# Patient Record
Sex: FEMALE | Race: BLACK OR AFRICAN AMERICAN
[De-identification: names, ages, dates, MRNs, and addresses within clinical notes are randomized per-mention and may not be internally consistent; named-entity substitution may affect disease eponyms.]

---

## 2022-02-07 ENCOUNTER — HOSPITAL ENCOUNTER (OUTPATIENT)
Age: 77
End: 2022-02-07
Payer: MEDICARE

## 2022-02-07 DIAGNOSIS — Z01.812: Primary | ICD-10-CM

## 2022-02-07 DIAGNOSIS — Z11.52: ICD-10-CM

## 2022-02-07 PROCEDURE — U0003 INFECTIOUS AGENT DETECTION BY NUCLEIC ACID (DNA OR RNA); SEVERE ACUTE RESPIRATORY SYNDROME CORONAVIRUS 2 (SARS-COV-2) (CORONAVIRUS DISEASE [COVID-19]), AMPLIFIED PROBE TECHNIQUE, MAKING USE OF HIGH THROUGHPUT TECHNOLOGIES AS DESCRIBED BY CMS-2020-01-R: HCPCS

## 2022-02-07 PROCEDURE — C9803 HOPD COVID-19 SPEC COLLECT: HCPCS

## 2022-02-07 PROCEDURE — U0005 INFEC AGEN DETEC AMPLI PROBE: HCPCS

## 2022-02-08 ENCOUNTER — HOSPITAL ENCOUNTER (OUTPATIENT)
Dept: HOSPITAL 22 - OR | Age: 77
Discharge: HOME | End: 2022-02-08
Payer: MEDICARE

## 2022-02-08 VITALS
TEMPERATURE: 98.24 F | OXYGEN SATURATION: 100 % | HEART RATE: 86 BPM | RESPIRATION RATE: 16 BRPM | SYSTOLIC BLOOD PRESSURE: 135 MMHG | DIASTOLIC BLOOD PRESSURE: 79 MMHG

## 2022-02-08 VITALS
RESPIRATION RATE: 16 BRPM | OXYGEN SATURATION: 100 % | DIASTOLIC BLOOD PRESSURE: 71 MMHG | SYSTOLIC BLOOD PRESSURE: 111 MMHG | HEART RATE: 67 BPM

## 2022-02-08 VITALS
RESPIRATION RATE: 16 BRPM | OXYGEN SATURATION: 100 % | DIASTOLIC BLOOD PRESSURE: 67 MMHG | HEART RATE: 61 BPM | SYSTOLIC BLOOD PRESSURE: 105 MMHG

## 2022-02-08 VITALS
HEART RATE: 63 BPM | RESPIRATION RATE: 16 BRPM | SYSTOLIC BLOOD PRESSURE: 104 MMHG | DIASTOLIC BLOOD PRESSURE: 67 MMHG | OXYGEN SATURATION: 100 %

## 2022-02-08 VITALS
RESPIRATION RATE: 16 BRPM | HEART RATE: 65 BPM | OXYGEN SATURATION: 100 % | SYSTOLIC BLOOD PRESSURE: 100 MMHG | DIASTOLIC BLOOD PRESSURE: 62 MMHG

## 2022-02-08 VITALS
HEART RATE: 79 BPM | RESPIRATION RATE: 18 BRPM | DIASTOLIC BLOOD PRESSURE: 69 MMHG | TEMPERATURE: 97.16 F | SYSTOLIC BLOOD PRESSURE: 130 MMHG | OXYGEN SATURATION: 97 %

## 2022-02-08 VITALS
DIASTOLIC BLOOD PRESSURE: 70 MMHG | RESPIRATION RATE: 16 BRPM | HEART RATE: 67 BPM | OXYGEN SATURATION: 100 % | SYSTOLIC BLOOD PRESSURE: 111 MMHG

## 2022-02-08 VITALS
HEART RATE: 72 BPM | SYSTOLIC BLOOD PRESSURE: 130 MMHG | OXYGEN SATURATION: 98 % | RESPIRATION RATE: 16 BRPM | DIASTOLIC BLOOD PRESSURE: 65 MMHG

## 2022-02-08 VITALS
HEART RATE: 65 BPM | OXYGEN SATURATION: 100 % | RESPIRATION RATE: 16 BRPM | DIASTOLIC BLOOD PRESSURE: 63 MMHG | SYSTOLIC BLOOD PRESSURE: 109 MMHG

## 2022-02-08 VITALS
RESPIRATION RATE: 16 BRPM | OXYGEN SATURATION: 100 % | HEART RATE: 64 BPM | DIASTOLIC BLOOD PRESSURE: 65 MMHG | SYSTOLIC BLOOD PRESSURE: 115 MMHG

## 2022-02-08 VITALS — BODY MASS INDEX: 28.3 KG/M2

## 2022-02-08 DIAGNOSIS — E11.9: ICD-10-CM

## 2022-02-08 DIAGNOSIS — I10: ICD-10-CM

## 2022-02-08 DIAGNOSIS — E78.5: ICD-10-CM

## 2022-02-08 DIAGNOSIS — H25.813: Primary | ICD-10-CM

## 2022-02-08 DIAGNOSIS — Z79.84: ICD-10-CM

## 2022-02-08 DIAGNOSIS — Z80.9: ICD-10-CM

## 2022-02-08 DIAGNOSIS — H53.149: ICD-10-CM

## 2022-02-08 DIAGNOSIS — Z79.82: ICD-10-CM

## 2022-02-08 DIAGNOSIS — Z87.891: ICD-10-CM

## 2022-02-08 DIAGNOSIS — Z79.899: ICD-10-CM

## 2022-02-08 DIAGNOSIS — H59.211: ICD-10-CM

## 2022-02-08 PROCEDURE — 66984 XCAPSL CTRC RMVL W/O ECP: CPT

## 2022-02-08 PROCEDURE — 82962 GLUCOSE BLOOD TEST: CPT

## 2022-02-08 PROCEDURE — V2632 POST CHMBR INTRAOCULAR LENS: HCPCS

## 2022-02-19 ENCOUNTER — HOSPITAL ENCOUNTER (OUTPATIENT)
Age: 77
End: 2022-02-19
Payer: MEDICARE

## 2022-02-19 DIAGNOSIS — Z11.52: ICD-10-CM

## 2022-02-19 DIAGNOSIS — Z01.812: Primary | ICD-10-CM

## 2022-02-19 PROCEDURE — U0003 INFECTIOUS AGENT DETECTION BY NUCLEIC ACID (DNA OR RNA); SEVERE ACUTE RESPIRATORY SYNDROME CORONAVIRUS 2 (SARS-COV-2) (CORONAVIRUS DISEASE [COVID-19]), AMPLIFIED PROBE TECHNIQUE, MAKING USE OF HIGH THROUGHPUT TECHNOLOGIES AS DESCRIBED BY CMS-2020-01-R: HCPCS

## 2022-02-19 PROCEDURE — U0005 INFEC AGEN DETEC AMPLI PROBE: HCPCS

## 2022-02-19 PROCEDURE — C9803 HOPD COVID-19 SPEC COLLECT: HCPCS

## 2022-02-22 ENCOUNTER — HOSPITAL ENCOUNTER (OUTPATIENT)
Dept: HOSPITAL 22 - OR | Age: 77
Discharge: HOME | End: 2022-02-22
Payer: MEDICARE

## 2022-02-22 VITALS
OXYGEN SATURATION: 98 % | SYSTOLIC BLOOD PRESSURE: 117 MMHG | HEART RATE: 77 BPM | TEMPERATURE: 96.98 F | DIASTOLIC BLOOD PRESSURE: 71 MMHG | RESPIRATION RATE: 18 BRPM

## 2022-02-22 VITALS
OXYGEN SATURATION: 100 % | DIASTOLIC BLOOD PRESSURE: 70 MMHG | HEART RATE: 63 BPM | RESPIRATION RATE: 16 BRPM | SYSTOLIC BLOOD PRESSURE: 117 MMHG

## 2022-02-22 VITALS
HEART RATE: 98 BPM | OXYGEN SATURATION: 99 % | RESPIRATION RATE: 16 BRPM | SYSTOLIC BLOOD PRESSURE: 129 MMHG | TEMPERATURE: 97.9 F | DIASTOLIC BLOOD PRESSURE: 81 MMHG

## 2022-02-22 VITALS
OXYGEN SATURATION: 100 % | DIASTOLIC BLOOD PRESSURE: 75 MMHG | HEART RATE: 62 BPM | SYSTOLIC BLOOD PRESSURE: 122 MMHG | RESPIRATION RATE: 18 BRPM

## 2022-02-22 VITALS
SYSTOLIC BLOOD PRESSURE: 120 MMHG | OXYGEN SATURATION: 100 % | DIASTOLIC BLOOD PRESSURE: 71 MMHG | HEART RATE: 64 BPM | RESPIRATION RATE: 18 BRPM

## 2022-02-22 VITALS
OXYGEN SATURATION: 99 % | SYSTOLIC BLOOD PRESSURE: 134 MMHG | HEART RATE: 70 BPM | DIASTOLIC BLOOD PRESSURE: 76 MMHG | RESPIRATION RATE: 20 BRPM

## 2022-02-22 VITALS
OXYGEN SATURATION: 100 % | DIASTOLIC BLOOD PRESSURE: 69 MMHG | RESPIRATION RATE: 18 BRPM | HEART RATE: 72 BPM | SYSTOLIC BLOOD PRESSURE: 122 MMHG

## 2022-02-22 VITALS — BODY MASS INDEX: 28.3 KG/M2

## 2022-02-22 DIAGNOSIS — Z83.3: ICD-10-CM

## 2022-02-22 DIAGNOSIS — E11.9: ICD-10-CM

## 2022-02-22 DIAGNOSIS — Z79.899: ICD-10-CM

## 2022-02-22 DIAGNOSIS — H25.813: Primary | ICD-10-CM

## 2022-02-22 DIAGNOSIS — E78.5: ICD-10-CM

## 2022-02-22 DIAGNOSIS — H53.149: ICD-10-CM

## 2022-02-22 DIAGNOSIS — I10: ICD-10-CM

## 2022-02-22 DIAGNOSIS — Z82.49: ICD-10-CM

## 2022-02-22 DIAGNOSIS — Z79.82: ICD-10-CM

## 2022-02-22 PROCEDURE — V2632 POST CHMBR INTRAOCULAR LENS: HCPCS

## 2022-02-22 PROCEDURE — 82962 GLUCOSE BLOOD TEST: CPT

## 2022-02-22 PROCEDURE — 66984 XCAPSL CTRC RMVL W/O ECP: CPT

## 2022-05-21 ENCOUNTER — HOSPITAL ENCOUNTER (OUTPATIENT)
Age: 77
End: 2022-05-21
Payer: MEDICARE

## 2022-05-21 DIAGNOSIS — Z01.812: Primary | ICD-10-CM

## 2022-05-21 DIAGNOSIS — Z20.822: ICD-10-CM

## 2022-05-21 PROCEDURE — C9803 HOPD COVID-19 SPEC COLLECT: HCPCS

## 2022-05-21 PROCEDURE — U0005 INFEC AGEN DETEC AMPLI PROBE: HCPCS

## 2022-05-21 PROCEDURE — U0003 INFECTIOUS AGENT DETECTION BY NUCLEIC ACID (DNA OR RNA); SEVERE ACUTE RESPIRATORY SYNDROME CORONAVIRUS 2 (SARS-COV-2) (CORONAVIRUS DISEASE [COVID-19]), AMPLIFIED PROBE TECHNIQUE, MAKING USE OF HIGH THROUGHPUT TECHNOLOGIES AS DESCRIBED BY CMS-2020-01-R: HCPCS

## 2022-05-24 ENCOUNTER — HOSPITAL ENCOUNTER (OUTPATIENT)
Dept: HOSPITAL 22 - OUTP | Age: 77
Discharge: HOME | End: 2022-05-24
Payer: MEDICARE

## 2022-05-24 VITALS
DIASTOLIC BLOOD PRESSURE: 78 MMHG | SYSTOLIC BLOOD PRESSURE: 133 MMHG | HEART RATE: 63 BPM | TEMPERATURE: 98.42 F | OXYGEN SATURATION: 99 % | RESPIRATION RATE: 16 BRPM

## 2022-05-24 VITALS
SYSTOLIC BLOOD PRESSURE: 126 MMHG | RESPIRATION RATE: 16 BRPM | OXYGEN SATURATION: 97 % | TEMPERATURE: 97.8 F | HEART RATE: 66 BPM | DIASTOLIC BLOOD PRESSURE: 77 MMHG

## 2022-05-24 VITALS — BODY MASS INDEX: 27.1 KG/M2

## 2022-05-24 DIAGNOSIS — Z96.1: ICD-10-CM

## 2022-05-24 DIAGNOSIS — Z82.49: ICD-10-CM

## 2022-05-24 DIAGNOSIS — H26.491: Primary | ICD-10-CM

## 2022-05-24 DIAGNOSIS — H02.834: ICD-10-CM

## 2022-05-24 DIAGNOSIS — E11.9: ICD-10-CM

## 2022-05-24 DIAGNOSIS — I10: ICD-10-CM

## 2022-05-24 DIAGNOSIS — Z79.82: ICD-10-CM

## 2022-05-24 DIAGNOSIS — Z83.3: ICD-10-CM

## 2022-05-24 DIAGNOSIS — H02.831: ICD-10-CM

## 2022-05-24 DIAGNOSIS — Z80.9: ICD-10-CM

## 2022-05-24 DIAGNOSIS — Z79.84: ICD-10-CM

## 2022-05-24 DIAGNOSIS — Z79.899: ICD-10-CM

## 2022-05-24 LAB — GLUCOSE BLDC GLUCOMTR-MCNC: 127 MG/DL (ref 70–110)

## 2022-05-24 PROCEDURE — 66821 AFTER CATARACT LASER SURGERY: CPT

## 2022-05-24 PROCEDURE — 82962 GLUCOSE BLOOD TEST: CPT

## 2022-08-09 RX ORDER — TRIAMTERENE AND HYDROCHLOROTHIAZIDE 37.5; 25 MG/1; MG/1
1 CAPSULE ORAL EVERY MORNING
COMMUNITY
End: 2022-08-09 | Stop reason: SDUPTHER

## 2022-08-09 RX ORDER — TRIAMTERENE AND HYDROCHLOROTHIAZIDE 37.5; 25 MG/1; MG/1
1 CAPSULE ORAL EVERY MORNING
Qty: 30 CAPSULE | Refills: 2 | Status: SHIPPED | OUTPATIENT
Start: 2022-08-09 | End: 2022-11-07 | Stop reason: SDUPTHER

## 2022-09-29 ENCOUNTER — TELEPHONE (OUTPATIENT)
Dept: FAMILY MEDICINE CLINIC | Facility: CLINIC | Age: 77
End: 2022-09-29

## 2022-09-29 LAB
GLUCOSE BLDC GLUCOMTR-MCNC: 122 MG/DL (ref 70–110)
GLUCOSE BLDC GLUCOMTR-MCNC: 128 MG/DL (ref 70–110)

## 2022-09-29 NOTE — TELEPHONE ENCOUNTER
Caller: Norma Fletcher    Relationship: Self    Best call back number: [phone]    Requested Prescriptions:  METFORMIN 500 MG TABLETS 90 DAY SUPPLY     Pharmacy where request should be sent: [address] [phone] [facility] [phone]      Additional details provided by patient: WILL RUN OUT OVER THE WEEKEND    Does the patient have less than a 3 day supply:  [] Yes  [x] No    Aliza Palacios Rep   09/29/22 11:59 EDT

## 2022-10-10 ENCOUNTER — CLINICAL SUPPORT (OUTPATIENT)
Dept: FAMILY MEDICINE CLINIC | Facility: CLINIC | Age: 77
End: 2022-10-10

## 2022-10-10 DIAGNOSIS — Z23 FLU VACCINE NEED: Primary | ICD-10-CM

## 2022-10-10 PROCEDURE — 90662 IIV NO PRSV INCREASED AG IM: CPT | Performed by: INTERNAL MEDICINE

## 2022-10-10 PROCEDURE — G0008 ADMIN INFLUENZA VIRUS VAC: HCPCS | Performed by: INTERNAL MEDICINE

## 2022-11-07 RX ORDER — TRIAMTERENE AND HYDROCHLOROTHIAZIDE 37.5; 25 MG/1; MG/1
1 CAPSULE ORAL EVERY MORNING
Qty: 30 CAPSULE | Refills: 1 | Status: SHIPPED | OUTPATIENT
Start: 2022-11-07 | End: 2023-01-04 | Stop reason: SDUPTHER

## 2022-11-07 NOTE — TELEPHONE ENCOUNTER
Caller: Norma Fletcher    Relationship: Self    Best call back number: [phone]    Requested Prescriptions:   Requested Prescriptions     Pending Prescriptions Disp Refills   • triamterene-hydrochlorothiazide (DYAZIDE) 37.5-25 MG per capsule 30 capsule 2     Sig: Take 1 capsule by mouth Every Morning.        Pharmacy where request should be sent: [facility] [address] [place] [address] [phone] [facility] [phone]      Additional details provided by patient: PATIENT WOULD LIKE 90 DAY SUPPLY PLEASE.  WILL BE OUT OF PILLS TOMORROW.      Does the patient have less than a 3 day supply:  [x] Yes  [] No    Hue Tang   11/07/22 11:02 EST

## 2022-11-15 ENCOUNTER — TELEPHONE (OUTPATIENT)
Dept: FAMILY MEDICINE CLINIC | Facility: CLINIC | Age: 77
End: 2022-11-15

## 2022-11-15 NOTE — TELEPHONE ENCOUNTER
Caller: Norma Fletcher    Relationship: Self    Best call back number: [phone]    What is the best time to reach you: ANYTIME    Who are you requesting to speak with (clinical staff, provider,  specific staff member): CLINICAL STAFF    Do you know the name of the person who called: YAJAIRA    What was the call regarding: FOLLOW UP ABOUT METFORMIN DOSAGE. PATIENT STATES HER DOSAGE SHOULD BE 1.5 TABLETS 2 TIMES DAILY.    Do you require a callback: YES

## 2022-11-30 ENCOUNTER — OFFICE VISIT (OUTPATIENT)
Dept: FAMILY MEDICINE CLINIC | Facility: CLINIC | Age: 77
End: 2022-11-30

## 2022-11-30 ENCOUNTER — TELEPHONE (OUTPATIENT)
Dept: FAMILY MEDICINE CLINIC | Facility: CLINIC | Age: 77
End: 2022-11-30

## 2022-11-30 VITALS
RESPIRATION RATE: 16 BRPM | TEMPERATURE: 97.1 F | SYSTOLIC BLOOD PRESSURE: 151 MMHG | HEIGHT: 62 IN | BODY MASS INDEX: 28.39 KG/M2 | DIASTOLIC BLOOD PRESSURE: 80 MMHG | HEART RATE: 76 BPM | WEIGHT: 154.3 LBS

## 2022-11-30 DIAGNOSIS — B34.9 ACUTE VIRAL SYNDROME: Primary | ICD-10-CM

## 2022-11-30 PROCEDURE — 99213 OFFICE O/P EST LOW 20 MIN: CPT | Performed by: NURSE PRACTITIONER

## 2022-11-30 RX ORDER — ASPIRIN 81 MG/1
TABLET ORAL
COMMUNITY

## 2022-11-30 RX ORDER — FLUTICASONE PROPIONATE 50 MCG
2 SPRAY, SUSPENSION (ML) NASAL DAILY
Qty: 9.9 ML | Refills: 0 | Status: SHIPPED | OUTPATIENT
Start: 2022-11-30 | End: 2023-03-27

## 2022-11-30 RX ORDER — BROMPHENIRAMINE MALEATE, PSEUDOEPHEDRINE HYDROCHLORIDE, AND DEXTROMETHORPHAN HYDROBROMIDE 2; 30; 10 MG/5ML; MG/5ML; MG/5ML
SYRUP ORAL
Qty: 150 ML | Refills: 0 | Status: SHIPPED | OUTPATIENT
Start: 2022-11-30 | End: 2023-02-27

## 2022-11-30 NOTE — ASSESSMENT & PLAN NOTE
Complaints of cough and congestion x 3 days. No accompanying symptoms including sore throat, fever or ear pain. She reports she doesn't necessarily feel poorly but was being seen today as more of a precautionary measure as she would be exposing her  who has leukemia if she does have flu or COVID  Both flu and COVID negative in office today    -Symptom management  -Flonase nasal as written to assist with congestion  -Bromfed DM for cough  -Encouraged hydration and rest  -Advised wearing mask around her  and frequent handwashing until symptoms subside.

## 2022-11-30 NOTE — PROGRESS NOTES
"    Office Note     Name: Norma Fletcher    : 1945     MRN: [id number]     Chief Complaint  Cough (congestion)    Subjective     History of Present Illness:  Norma Fletcher is a 77 y.o. female who presents today for acute complaints of cough and congestion x three days. She reports on Monday she began to have a slight cough and some mild congestion which hasn't necessarily worsened and she \"doesn't feel that bad\". 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 She has not utilized OTC medications. She denies sore throat, fever, headache, body aches, nausea, vomiting or diarrhea. No known sick contacts.     Review of Systems   Constitutional: Negative for activity change, appetite change, fatigue and fever.   HENT: Positive for congestion and rhinorrhea. Negative for ear pain and sore throat.    Eyes: Negative for visual disturbance.   Respiratory: Positive for cough. Negative for chest tightness, shortness of breath and wheezing.    Cardiovascular: Negative for chest pain, palpitations and leg swelling.   Gastrointestinal: Negative for abdominal pain, constipation, diarrhea, nausea and vomiting.   Musculoskeletal: Negative for arthralgias and myalgias.   Skin: Negative for rash.   Neurological: Negative for dizziness, syncope, weakness, light-headedness, headache and confusion.       Objective     Past Medical History:   Diagnosis Date   • Dyslipidemia    • Dysphagia     Recurrent dysphagia secondary to Schatzki's ring; EGD 2014, 2015, Dr. Nicholson   • Essential (primary) hypertension    • Fibrocystic breast disease     status post stereotactic biopsy 9/15/09   • Hepatitis     A, B, and C, status post GI consult with no evidence of active disease   • Hypertension    • Iron deficiency    • Mixed hyperlipidemia    • Rash and other nonspecific skin eruption    • Synovial cyst " "of popliteal space (Baker), left knee    • Type 2 diabetes mellitus without complications (HCC)    • Unilateral primary osteoarthritis, left knee      Past Surgical History:   Procedure Laterality Date   • CATARACT EXTRACTION, BILATERAL  02/2022   • COLONOSCOPY      multiple all negative, most recently 7/15/2020 completely normal, Dr. Nicholson, recommendation for five-year followup in the year 2025     Family History   Problem Relation Age of Onset   • Breast cancer Other    • Colon cancer Other        Vital Signs  /80 (BP Location: Left arm, Patient Position: Sitting, Cuff Size: Adult)   Pulse 76   Temp 97.1 °F (36.2 °C) (Temporal)   Resp 16   Ht 157.5 cm (62\")   Wt 70 kg (154 lb 4.8 oz)   BMI 28.22 kg/m²   Estimated body mass index is 28.22 kg/m² as calculated from the following:    Height as of this encounter: 157.5 cm (62\").    Weight as of this encounter: 70 kg (154 lb 4.8 oz).    Physical Exam  Constitutional:       Appearance: Normal appearance.   HENT:      Head: Normocephalic and atraumatic.      Right Ear: Tympanic membrane, ear canal and external ear normal.      Left Ear: Tympanic membrane, ear canal and external ear normal.      Nose: Congestion and rhinorrhea present.      Mouth/Throat:      Mouth: Mucous membranes are moist.      Pharynx: Posterior oropharyngeal erythema present.   Eyes:      Conjunctiva/sclera: Conjunctivae normal.   Cardiovascular:      Rate and Rhythm: Normal rate and regular rhythm.      Heart sounds: Normal heart sounds.   Pulmonary:      Breath sounds: Normal breath sounds.   Abdominal:      General: Bowel sounds are normal.      Palpations: Abdomen is soft.   Musculoskeletal:         General: Normal range of motion.      Cervical back: Normal range of motion and neck supple.   Skin:     General: Skin is warm and dry.   Neurological:      Mental Status: She is alert and oriented to person, place, and time.   Psychiatric:         Mood and Affect: Mood normal.         " Behavior: Behavior normal.         Thought Content: Thought content normal.         Judgment: Judgment normal.            POCT Results (if applicable):  No results found for this or any previous visit.         Assessment and Plan     Diagnoses and all orders for this visit:    1. Acute viral syndrome (Primary)  Assessment & Plan:  Complaints of cough and congestion x 3 days. No accompanying symptoms including sore throat, fever or ear pain. She reports she doesn't necessarily feel poorly but was being seen today as more of a precautionary measure as she would be exposing her  who has leukemia if she does have flu or COVID  Both flu and COVID negative in office today    -Symptom management  -Flonase nasal as written to assist with congestion  -Bromfed DM for cough  -Encouraged hydration and rest  -Advised wearing mask around her  and frequent handwashing until symptoms subside.      Other orders  -     brompheniramine-pseudoephedrine-DM 30-2-10 MG/5ML syrup; Take 10mL by mouth up to four times daily as needed for cough and congestion  Dispense: 150 mL; Refill: 0  -     fluticasone (Flonase) 50 MCG/ACT nasal spray; 2 sprays into the nostril(s) as directed by provider Daily.  Dispense: 9.9 mL; Refill: 0      Follow Up  No follow-ups on file.    BRANDON Crabtree

## 2022-11-30 NOTE — TELEPHONE ENCOUNTER
Caller: [facility] [address] [place] [address] [facility] [phone] [facility] [phone]     Relationship: Pharmacy      What medications are you currently taking:   Current Outpatient Medications on File Prior to Visit   Medication Sig Dispense Refill   • aspirin 81 MG EC tablet Asprin Ec Low Dose 81 mg tablet,delayed release   Take 1 tablet every day by oral route.     • brompheniramine-pseudoephedrine-DM 30-2-10 MG/5ML syrup Take 10mL by mouth up to four times daily as needed for cough and congestion 150 mL 0   • fluticasone (Flonase) 50 MCG/ACT nasal spray 2 sprays into the nostril(s) as directed by provider Daily. 9.9 mL 0   • metFORMIN (Glucophage) 500 MG tablet Take 1-1/2 tablets twice daily 270 tablet 1   • triamterene-hydrochlorothiazide (DYAZIDE) 37.5-25 MG per capsule Take 1 capsule by mouth Every Morning. 30 capsule 1     No current facility-administered medications on file prior to visit.        What are your concerns:     STATED NOT ABLE TO GET THE 9.9ML FOR RX  fluticasone (Flonase) 50 MCG/ACT nasal spray      ONLY HAVE THE 16ML

## 2023-01-04 RX ORDER — TRIAMTERENE AND HYDROCHLOROTHIAZIDE 37.5; 25 MG/1; MG/1
1 CAPSULE ORAL EVERY MORNING
Qty: 30 CAPSULE | Refills: 1 | Status: SHIPPED | OUTPATIENT
Start: 2023-01-04 | End: 2023-03-06 | Stop reason: SDUPTHER

## 2023-01-04 NOTE — TELEPHONE ENCOUNTER
Caller: Norma Fletcher    Relationship: Self    Best call back number: [phone]     Requested Prescriptions:   Requested Prescriptions     Pending Prescriptions Disp Refills   • triamterene-hydrochlorothiazide (DYAZIDE) 37.5-25 MG per capsule 30 capsule 1     Sig: Take 1 capsule by mouth Every Morning.        Pharmacy where request should be sent: [facility] [address] [place] [address] [phone] [facility] [phone]      Additional details provided by patient: PATIENT HAS 4 DAYS LEFT.  PATIENT ASKED FOR A 90 DAY SUPPLY.    Does the patient have less than a 3 day supply:  [] Yes  [x] No    Would you like a call back once the refill request has been completed: [] Yes [x] No    If the office needs to give you a call back, can they leave a voicemail: [] Yes [x] No    Aliza Childress Rep   01/04/23 13:16 EST

## 2023-02-13 ENCOUNTER — TELEPHONE (OUTPATIENT)
Dept: FAMILY MEDICINE CLINIC | Facility: CLINIC | Age: 78
End: 2023-02-13

## 2023-02-13 DIAGNOSIS — I10 HYPERTENSION, UNSPECIFIED TYPE: Primary | ICD-10-CM

## 2023-02-13 DIAGNOSIS — E78.2 MODERATE MIXED HYPERLIPIDEMIA NOT REQUIRING STATIN THERAPY: ICD-10-CM

## 2023-02-13 RX ORDER — LISINOPRIL 20 MG/1
20 TABLET ORAL DAILY
Qty: 90 TABLET | Refills: 0 | Status: SHIPPED | OUTPATIENT
Start: 2023-02-13

## 2023-02-13 RX ORDER — LISINOPRIL 20 MG/1
1 TABLET ORAL DAILY
COMMUNITY
Start: 2022-11-12 | End: 2023-02-13 | Stop reason: SDUPTHER

## 2023-02-13 RX ORDER — PRAVASTATIN SODIUM 40 MG
TABLET ORAL
COMMUNITY
Start: 2022-11-12 | End: 2023-02-13 | Stop reason: SDUPTHER

## 2023-02-13 RX ORDER — PRAVASTATIN SODIUM 40 MG
40 TABLET ORAL DAILY
Qty: 90 TABLET | Refills: 0 | Status: SHIPPED | OUTPATIENT
Start: 2023-02-13

## 2023-02-13 NOTE — TELEPHONE ENCOUNTER
Caller: Norma Fletcher    Relationship: Self    Best call back number: [phone]    Requested Prescriptions:   Requested Prescriptions      No prescriptions requested or ordered in this encounter        Pharmacy where request should be sent:      Additional details provided by patient: PATIENT HAS SEVERAL MEDICATION PRESCRIBED BY OLD PCP, NEEDS REFILLS  Verapamil 120MG/QD  PRAVASTATIN 40MG/QD  LISINOPRIL 20MG/QD    PATIENT WOULD LIKE 90 DAY SUPPLY, PLEASE ADVISE IF A VISIT IS NEEDED, OR IF THEY CAN JUST BE FILLED AS 90 DAY      Does the patient have less than a 3 day supply:  [x] Yes  [] No    Would you like a call back once the refill request has been completed: [x] Yes [] No    If the office needs to give you a call back, can they leave a voicemail: [x] Yes [] No    Aliza Iqbal Rep   02/13/23 10:14 EST

## 2023-02-13 NOTE — TELEPHONE ENCOUNTER
Called and discussed with pt that she needs a physical in May will send in 90 days supply to get her to her appt that we scheduled today. IF SHE DOES NOT KEEP APPT she will NOT get anymore refills. She voiced understanding.

## 2023-02-27 ENCOUNTER — OFFICE VISIT (OUTPATIENT)
Dept: FAMILY MEDICINE CLINIC | Facility: CLINIC | Age: 78
End: 2023-02-27
Payer: MEDICARE

## 2023-02-27 VITALS
SYSTOLIC BLOOD PRESSURE: 128 MMHG | BODY MASS INDEX: 27.68 KG/M2 | RESPIRATION RATE: 16 BRPM | DIASTOLIC BLOOD PRESSURE: 74 MMHG | HEIGHT: 62 IN | TEMPERATURE: 97.3 F | HEART RATE: 78 BPM | OXYGEN SATURATION: 99 % | WEIGHT: 150.4 LBS

## 2023-02-27 DIAGNOSIS — R00.2 PALPITATIONS: Primary | ICD-10-CM

## 2023-02-27 DIAGNOSIS — E78.2 MIXED HYPERLIPIDEMIA: ICD-10-CM

## 2023-02-27 DIAGNOSIS — E11.9 CONTROLLED TYPE 2 DIABETES MELLITUS WITHOUT COMPLICATION, WITHOUT LONG-TERM CURRENT USE OF INSULIN: ICD-10-CM

## 2023-02-27 DIAGNOSIS — F41.8 SITUATIONAL ANXIETY: ICD-10-CM

## 2023-02-27 DIAGNOSIS — I10 PRIMARY HYPERTENSION: ICD-10-CM

## 2023-02-27 PROBLEM — G44.209 ACUTE NON INTRACTABLE TENSION-TYPE HEADACHE: Status: ACTIVE | Noted: 2023-02-27

## 2023-02-27 PROCEDURE — 99213 OFFICE O/P EST LOW 20 MIN: CPT | Performed by: NURSE PRACTITIONER

## 2023-02-27 PROCEDURE — 36415 COLL VENOUS BLD VENIPUNCTURE: CPT | Performed by: NURSE PRACTITIONER

## 2023-02-27 RX ORDER — FLUOXETINE HYDROCHLORIDE 20 MG/1
20 CAPSULE ORAL DAILY
Qty: 30 CAPSULE | Refills: 2 | Status: SHIPPED | OUTPATIENT
Start: 2023-02-27 | End: 2023-03-27

## 2023-02-27 NOTE — PROGRESS NOTES
Venipuncture Blood Specimen Collection  Venipuncture performed in left arm by Anne Hernandez MA with good hemostasis. Patient tolerated the procedure well without complications.   02/27/23   Anne Hernandez MA

## 2023-02-27 NOTE — ASSESSMENT & PLAN NOTE
A1c in office today at 6.7% which is below goal.  She checks her glucose at home once weekly, reporting readings are never over 150.  Currently utilizing metformin 500 mg once daily

## 2023-02-27 NOTE — ASSESSMENT & PLAN NOTE
"She reports feelings of \"flip-flop\" in her chest for the last couple of months.  She had the same issue many years ago at which time she underwent full cardiac evaluation without abnormal findings.  She cut back on caffeine and was put on Inderol with full resolution. Accompanied by headache and dizziness at times. BP well controlled.  She does have a significant amount of stress in her life currently with her  undergoing chemotherapy in [place] several days per week.    -EKG in office today at baseline, sinus rhythm with sinus arrhythmia.  -She denies any chest pain, shortness of breath or fluid retention  -Will stop caffeine consumption.  -We will check electrolytes and TSH today  -Discussed the fact that her symptoms could be related to anxiety which she endorses could be a direct possibility.  As a result we will initiate fluoxetine 20 mg once daily and monitor report for benefit in 3-weeks  -If palpitations continue will consider initiation of beta-blocker which has provided good benefit in the past  "

## 2023-02-27 NOTE — ASSESSMENT & PLAN NOTE
Well-controlled in office today.  She feels that her blood pressure at times runs high at home , but highest reported blood pressure 138/82.  Continue current verapamil 120 mg daily, lisinopril 20 mg once daily and triamterene-HCTZ 37.5-25 mg once daily dose

## 2023-02-28 PROBLEM — F41.8 SITUATIONAL ANXIETY: Status: ACTIVE | Noted: 2023-02-28

## 2023-02-28 LAB
BUN SERPL-MCNC: 10 MG/DL (ref 8–27)
BUN/CREAT SERPL: 16 (ref 12–28)
CALCIUM SERPL-MCNC: 8.9 MG/DL (ref 8.7–10.3)
CHLORIDE SERPL-SCNC: 103 MMOL/L (ref 96–106)
CO2 SERPL-SCNC: 21 MMOL/L (ref 20–29)
CREAT SERPL-MCNC: 0.61 MG/DL (ref 0.57–1)
EGFRCR SERPLBLD CKD-EPI 2021: 93 ML/MIN/1.73
GLUCOSE SERPL-MCNC: NORMAL MG/DL
HBA1C MFR BLD: 6.7 % (ref 4.8–5.6)
POTASSIUM SERPL-SCNC: NORMAL MMOL/L
SODIUM SERPL-SCNC: 138 MMOL/L (ref 134–144)
TSH SERPL DL<=0.005 MIU/L-ACNC: 1.64 UIU/ML (ref 0.45–4.5)

## 2023-02-28 PROCEDURE — 93000 ELECTROCARDIOGRAM COMPLETE: CPT | Performed by: NURSE PRACTITIONER

## 2023-02-28 NOTE — ASSESSMENT & PLAN NOTE
Her  is currently receiving cancer treatment in the form of chemotherapy.  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  She does note this can be overwhelming at times.  Denies sleep disturbance or depressive symptoms.    -Agreeable to initiation of fluoxetine 20 mg once

## 2023-02-28 NOTE — PROGRESS NOTES
"    Office Note     Name: Norma Fletcher    : 1945     MRN: [id number]     Chief Complaint  Palpitations    Subjective     History of Present Illness:  Norma Fletcher is a 77 y.o. female who presents today for acute complaint of headache and palpitations.  She reports feeling \"flip-flops\" in her chest intermittently for the last couple of months.  She also notes headaches.  She describes these headaches as across the back of her head as well as her forehead.  Though her blood pressure is well controlled in office today, 128/74, reports she has intermittently had elevated readings at home.  She continues to walk to get her mail daily.  She notes history of palpitations in the past with benign full cardiac work-up.  At that time she was placed on Inderal and stopped caffeine consumption with full resolution of palpitations.  She does make note of some increased stressors in her life recently including her  currently going through chemotherapy treatment for cancer.  She denies any feelings of depression or sleep disturbance.  No further complaints or concerns    Review of Systems   Constitutional: Negative for activity change, appetite change and fatigue.   Eyes: Negative for visual disturbance.   Respiratory: Negative for cough, chest tightness and shortness of breath.    Cardiovascular: Positive for palpitations. Negative for chest pain and leg swelling.   Gastrointestinal: Negative for abdominal pain, constipation, diarrhea, nausea, vomiting and indigestion.   Endocrine: Negative for cold intolerance, heat intolerance, polydipsia and polyuria.   Musculoskeletal: Negative for arthralgias and myalgias.   Skin: Negative for rash and bruise.   Neurological: Positive for headache. Negative for dizziness.       Objective     Past Medical History:   Diagnosis Date   • Dyslipidemia    • Dysphagia     Recurrent dysphagia secondary to Schatzki's ring; EGD 2014, 2015, Dr. Nicholson   • Essential (primary) " "hypertension    • Fibrocystic breast disease     status post stereotactic biopsy 9/15/09   • Hepatitis     A, B, and C, status post GI consult with no evidence of active disease   • Hypertension    • Iron deficiency    • Mixed hyperlipidemia    • Rash and other nonspecific skin eruption    • Synovial cyst of popliteal space (Baker), left knee    • Type 2 diabetes mellitus without complications (HCC)    • Unilateral primary osteoarthritis, left knee      Past Surgical History:   Procedure Laterality Date   • CATARACT EXTRACTION, BILATERAL  02/2022   • COLONOSCOPY      multiple all negative, most recently 7/15/2020 completely normal, Dr. Nicholson, recommendation for five-year followup in the year 2025     Family History   Problem Relation Age of Onset   • Breast cancer Other    • Colon cancer Other        Vital Signs  /74 (BP Location: Left arm, Patient Position: Sitting, Cuff Size: Adult)   Pulse 78   Temp 97.3 °F (36.3 °C) (Temporal)   Resp 16   Ht 157.5 cm (62\")   Wt 68.2 kg (150 lb 6.4 oz)   SpO2 99%   BMI 27.51 kg/m²   Estimated body mass index is 27.51 kg/m² as calculated from the following:    Height as of this encounter: 157.5 cm (62\").    Weight as of this encounter: 68.2 kg (150 lb 6.4 oz).    Physical Exam  Vitals reviewed.   Constitutional:       Appearance: Normal appearance.   HENT:      Head: Normocephalic and atraumatic.      Right Ear: Tympanic membrane, ear canal and external ear normal.      Left Ear: Tympanic membrane, ear canal and external ear normal.      Nose: Nose normal.      Mouth/Throat:      Mouth: Mucous membranes are moist.      Pharynx: Oropharynx is clear.   Eyes:      Conjunctiva/sclera: Conjunctivae normal.   Cardiovascular:      Rate and Rhythm: Normal rate and regular rhythm.      Pulses: Normal pulses.      Heart sounds: Normal heart sounds.   Pulmonary:      Effort: Pulmonary effort is normal.      Breath sounds: Normal breath sounds.   Abdominal:      General: Bowel " "sounds are normal.   Musculoskeletal:         General: Normal range of motion.      Cervical back: Normal range of motion and neck supple.   Skin:     General: Skin is warm and dry.   Neurological:      Mental Status: She is alert and oriented to person, place, and time.   Psychiatric:         Mood and Affect: Mood normal.         Behavior: Behavior normal.         Thought Content: Thought content normal.         Judgment: Judgment normal.        POCT Results (if applicable):  Results for orders placed or performed in visit on 02/27/23   Hemoglobin A1c    Specimen: Arm, Left; Blood    Blood  Release to forrest   Result Value Ref Range    Hemoglobin A1C 6.7 (H) 4.8 - 5.6 %   Basic Metabolic Panel    Specimen: Arm, Left; Blood    Blood  Release to forrest   Result Value Ref Range    Glucose CANCELED mg/dL    BUN 10 8 - 27 mg/dL    Creatinine 0.61 0.57 - 1.00 mg/dL    EGFR Result 93 >59 mL/min/1.73    BUN/Creatinine Ratio 16 12 - 28    Sodium 138 134 - 144 mmol/L    Potassium CANCELED mmol/L    Chloride 103 96 - 106 mmol/L    Total CO2 21 20 - 29 mmol/L    Calcium 8.9 8.7 - 10.3 mg/dL   TSH Rfx On Abnormal To Free T4    Specimen: Arm, Left; Blood    Blood  Release to forrest   Result Value Ref Range    TSH 1.640 0.450 - 4.500 uIU/mL         ECG 12 Lead    Date/Time: 2/28/2023 12:59 PM  Performed by: Concepción Shea APRN  Authorized by: Concepción Shea APRN   Comparison: compared with previous ECG from 5/16/2022  Similar to previous ECG  Rhythm: sinus rhythm and sinus arrhythmia  Rate: normal  Conduction: conduction normal  ST Segments: ST segments normal  T Waves: T waves normal  QRS axis: left    Clinical impression: abnormal EKG  Comments: Abnormal EKG but at baseline with comparison to previous ECG less than one year ago            Assessment and Plan     Diagnoses and all orders for this visit:    1. Palpitations (Primary)  Assessment & Plan:  She reports feelings of \"flip-flop\" in her chest for the last couple of " months.  She had the same issue many years ago at which time she underwent full cardiac evaluation without abnormal findings.  She cut back on caffeine and was put on Inderol with full resolution. Accompanied by headache and dizziness at times. BP well controlled.  She does have a significant amount of stress in her life currently with her  undergoing chemotherapy in [place] several days per week.    -EKG in office today at baseline, sinus rhythm with sinus arrhythmia.  -She denies any chest pain, shortness of breath or fluid retention  -Will stop caffeine consumption.  -We will check electrolytes and TSH today  -Discussed the fact that her symptoms could be related to anxiety which she endorses could be a direct possibility.  As a result we will initiate fluoxetine 20 mg once daily and monitor report for benefit in 3-weeks  -If palpitations continue will consider initiation of beta-blocker which has provided good benefit in the past    Orders:  -     TSH Rfx On Abnormal To Free T4; Future  -     Basic Metabolic Panel; Future  -     Basic Metabolic Panel  -     TSH Rfx On Abnormal To Free T4  -     ECG 12 Lead    2. Controlled type 2 diabetes mellitus without complication, without long-term current use of insulin ([facility])  Assessment & Plan:  A1c in office today at 6.7% which is below goal.  She checks her glucose at home once weekly, reporting readings are never over 150.  Currently utilizing metformin 500 mg once daily    Orders:  -     Hemoglobin A1c; Future  -     Hemoglobin A1c    3. Primary hypertension  Assessment & Plan:  Well-controlled in office today.  She feels that her blood pressure at times runs high at home , but highest reported blood pressure 138/82.  Continue current verapamil 120 mg daily, lisinopril 20 mg once daily and triamterene-HCTZ 37.5-25 mg once daily dose      4. Mixed hyperlipidemia  Assessment & Plan:  Historically well controlled on daily pravastatin 40 mg      5. Situational  anxiety  Assessment & Plan:  Her  is currently receiving cancer treatment in the form of chemotherapy.  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  She does note this can be overwhelming at times.  Denies sleep disturbance or depressive symptoms.    -Agreeable to initiation of fluoxetine 20 mg once      Other orders  -     FLUoxetine (PROzac) 20 MG capsule; Take 1 capsule by mouth Daily.  Dispense: 30 capsule; Refill: 2    BMI is >= 25 and <30. (Overweight) The following options were offered after discussion;: exercise counseling/recommendations and nutrition counseling/recommendations  Follow Up  Return in about 4 weeks (around 3/27/2023) for Next scheduled follow up.    Concepción Shea, APRN

## 2023-03-01 ENCOUNTER — TELEPHONE (OUTPATIENT)
Dept: FAMILY MEDICINE CLINIC | Facility: CLINIC | Age: 78
End: 2023-03-01
Payer: MEDICARE

## 2023-03-01 NOTE — TELEPHONE ENCOUNTER
----- Message from BRANDON Crabtree sent at 3/1/2023  8:51 AM EST -----  Please let Mrs. Fletcher know I have reviewed her labs and all are satisfactory. When I see her for follow-up in a couple of weeks we will see if cutting back on caffeine and staring the medication for anxiety has helped her symptoms, if not we will discuss starting medication for her feelings of palpitations

## 2023-03-06 ENCOUNTER — TELEPHONE (OUTPATIENT)
Dept: FAMILY MEDICINE CLINIC | Facility: CLINIC | Age: 78
End: 2023-03-06
Payer: MEDICARE

## 2023-03-06 RX ORDER — TRIAMTERENE AND HYDROCHLOROTHIAZIDE 37.5; 25 MG/1; MG/1
1 CAPSULE ORAL EVERY MORNING
Qty: 30 CAPSULE | Refills: 1 | Status: SHIPPED | OUTPATIENT
Start: 2023-03-06 | End: 2023-04-04

## 2023-03-06 NOTE — TELEPHONE ENCOUNTER
Caller: Norma Fletcher    Relationship: Self    Best call back number:  [phone]    Requested Prescriptions:   Requested Prescriptions      No prescriptions requested or ordered in this encounter      triamterene-hydrochlorothiazide (DYAZIDE) 37.5-25 MG per capsule    Pharmacy where request should be sent:      [facility] [address] [place] [address] [phone] [facility] [phone] FX     Does the patient have less than a 3 day supply:  [] Yes  [x] No    Would you like a call back once the refill request has been completed: [] Yes [x] No    If the office needs to give you a call back, can they leave a voicemail: [] Yes [x] No    Radha Hanson, PCT   03/06/23 09:50 EST

## 2023-03-27 ENCOUNTER — OFFICE VISIT (OUTPATIENT)
Dept: FAMILY MEDICINE CLINIC | Facility: CLINIC | Age: 78
End: 2023-03-27
Payer: MEDICARE

## 2023-03-27 VITALS
SYSTOLIC BLOOD PRESSURE: 138 MMHG | OXYGEN SATURATION: 97 % | HEIGHT: 62 IN | TEMPERATURE: 97.2 F | WEIGHT: 148 LBS | BODY MASS INDEX: 27.23 KG/M2 | RESPIRATION RATE: 18 BRPM | HEART RATE: 77 BPM | DIASTOLIC BLOOD PRESSURE: 82 MMHG

## 2023-03-27 DIAGNOSIS — S16.1XXA STRAIN OF NECK MUSCLE, INITIAL ENCOUNTER: ICD-10-CM

## 2023-03-27 DIAGNOSIS — R00.2 PALPITATIONS: Primary | ICD-10-CM

## 2023-03-27 DIAGNOSIS — I10 PRIMARY HYPERTENSION: ICD-10-CM

## 2023-03-27 DIAGNOSIS — F41.8 SITUATIONAL ANXIETY: ICD-10-CM

## 2023-03-27 RX ORDER — DORZOLAMIDE HYDROCHLORIDE AND TIMOLOL MALEATE 20; 5 MG/ML; MG/ML
SOLUTION/ DROPS OPHTHALMIC
COMMUNITY
Start: 2023-03-07

## 2023-03-27 RX ORDER — ATENOLOL 25 MG/1
TABLET ORAL
Qty: 30 TABLET | Refills: 1 | Status: SHIPPED | OUTPATIENT
Start: 2023-03-27

## 2023-03-27 RX ORDER — FLUOXETINE HYDROCHLORIDE 40 MG/1
40 CAPSULE ORAL DAILY
Qty: 30 CAPSULE | Refills: 1 | Status: SHIPPED | OUTPATIENT
Start: 2023-03-27

## 2023-03-27 RX ORDER — CYCLOBENZAPRINE HCL 5 MG
5 TABLET ORAL 3 TIMES DAILY PRN
Qty: 15 TABLET | Refills: 0 | Status: SHIPPED | OUTPATIENT
Start: 2023-03-27

## 2023-03-27 NOTE — PROGRESS NOTES
"    Office Note     Name: Norma Fletcher    : 1945     MRN: [id number]     Chief Complaint  pain in neck    Subjective     History of Present Illness:  Norma Fletcher is a 77 y.o. female who presents today for regarding palpitations, anxiety and hypertension.  She also has acute complaints of neck strain after falling asleep in all positions on her couch 3 days ago.  She was seen 4 weeks ago at which time she presented with complaints of headaches and feeling \"flip-flops\" in her chest intermittently for the last several months.  EKG performed in office that day was at baseline showing sinus rhythm with sinus arrhythmia.  She has documented history of palpitations with benign full cardiac work-up.  She is under a significant amount of stress currently, as her  is undergoing chemotherapy treatment for cancer, requiring frequent travel and long days in [place].  She notes in regards to her palpitations, in the past she was placed on a beta-blocker and decreased caffeine consumption with good benefit.  It was decided she would begin low-dose Prozac for situational anxiety, cut out caffeine and return today for further evaluation.  She notes she is having some benefit from current Prozac dosing in regards to feeling less overwhelmed with her  situation.  She does not note any improvement in feelings of palpitations.  Blood pressure is well controlled.  No further complaints or concerns today    Review of Systems   Constitutional: Positive for fatigue. Negative for activity change and appetite change.   Eyes: Negative for blurred vision and double vision.   Respiratory: Negative for cough, choking, chest tightness, shortness of breath and wheezing.    Cardiovascular: Positive for palpitations. Negative for chest pain and leg swelling.   Gastrointestinal: Negative for abdominal pain, constipation, diarrhea, nausea and vomiting.   Endocrine: Negative for polydipsia and polyuria.   Musculoskeletal: " "Positive for neck pain and neck stiffness.   Skin: Negative for rash and bruise.   Neurological: Positive for headache. Negative for dizziness, weakness and light-headedness.   Hematological: Does not bruise/bleed easily.   Psychiatric/Behavioral: Positive for stress. Negative for self-injury, suicidal ideas and depressed mood.       Objective     Past Medical History:   Diagnosis Date   • Dyslipidemia    • Dysphagia     Recurrent dysphagia secondary to Schatzki's ring; EGD 7/30/2014, 12/9/2015, Dr. Nicholson   • Essential (primary) hypertension    • Fibrocystic breast disease     status post stereotactic biopsy 9/15/09   • Hepatitis     A, B, and C, status post GI consult with no evidence of active disease   • Hypertension    • Iron deficiency    • Mixed hyperlipidemia    • Rash and other nonspecific skin eruption    • Synovial cyst of popliteal space (Baker), left knee    • Type 2 diabetes mellitus without complications (HCC)    • Unilateral primary osteoarthritis, left knee      Past Surgical History:   Procedure Laterality Date   • CATARACT EXTRACTION, BILATERAL  02/2022   • COLONOSCOPY      multiple all negative, most recently 7/15/2020 completely normal, Dr. Nicholson, recommendation for five-year followup in the year 2025     Family History   Problem Relation Age of Onset   • Breast cancer Other    • Colon cancer Other        Vital Signs  /82 (BP Location: Left arm, Patient Position: Sitting, Cuff Size: Adult)   Pulse 77   Temp 97.2 °F (36.2 °C) (Temporal)   Resp 18   Ht 157.5 cm (62\")   Wt 67.1 kg (148 lb)   SpO2 97%   BMI 27.07 kg/m²   Estimated body mass index is 27.07 kg/m² as calculated from the following:    Height as of this encounter: 157.5 cm (62\").    Weight as of this encounter: 67.1 kg (148 lb).    Physical Exam  Vitals reviewed.   Constitutional:       Appearance: Normal appearance.   HENT:      Head: Normocephalic and atraumatic.   Eyes:      Conjunctiva/sclera: Conjunctivae normal. "   Cardiovascular:      Rate and Rhythm: Normal rate and regular rhythm.      Pulses: Normal pulses.      Heart sounds: Normal heart sounds.   Pulmonary:      Effort: Pulmonary effort is normal.      Breath sounds: Normal breath sounds.   Musculoskeletal:      Cervical back: Neck supple. Tenderness present. Pain with movement and muscular tenderness present. Normal range of motion.   Skin:     General: Skin is warm and dry.   Neurological:      Mental Status: She is alert and oriented to person, place, and time.   Psychiatric:         Mood and Affect: Mood normal.         Behavior: Behavior normal.         Thought Content: Thought content normal.         Judgment: Judgment normal.            POCT Results (if applicable):  Results for orders placed or performed in visit on 02/27/23   Hemoglobin A1c    Specimen: Arm, Left; Blood    Blood  Release to forrest   Result Value Ref Range    Hemoglobin A1C 6.7 (H) 4.8 - 5.6 %   Basic Metabolic Panel    Specimen: Arm, Left; Blood    Blood  Release to forrest   Result Value Ref Range    Glucose CANCELED mg/dL    BUN 10 8 - 27 mg/dL    Creatinine 0.61 0.57 - 1.00 mg/dL    EGFR Result 93 >59 mL/min/1.73    BUN/Creatinine Ratio 16 12 - 28    Sodium 138 134 - 144 mmol/L    Potassium CANCELED mmol/L    Chloride 103 96 - 106 mmol/L    Total CO2 21 20 - 29 mmol/L    Calcium 8.9 8.7 - 10.3 mg/dL   TSH Rfx On Abnormal To Free T4    Specimen: Arm, Left; Blood    Blood  Release to forrest   Result Value Ref Range    TSH 1.640 0.450 - 4.500 uIU/mL            Assessment and Plan     Diagnoses and all orders for this visit:    1. Palpitations (Primary)  Assessment & Plan:  Presented 4 weeks ago with feelings of palpitations in her chest for the last couple of months.  She had the same issue many years ago at which time she underwent full cardiac evaluation with out abnormal findings.  She has eliminated caffeine which has provided little benefit.  In the past she has had good benefit with initiation  of beta-blocker as well as eliminating caffeine.  -We will initiate atenolol 25 mg once daily for palpitations.  She has been advised to check pulse before taking medication, if heart rate less than 60 hold atenolol dose.  -If significant side effects such as extreme fatigue or shortness of breath occur she is to stop medication      2. Primary hypertension  Assessment & Plan:  Well-controlled.  She also reports readings at goal below 140/80 at home.  Continue current verapamil 120 mg daily, lisinopril 20 mg once daily and triamterene-HCTZ once daily dosing      3. Situational anxiety  Assessment & Plan:  Situational anxiety that began when her  months diagnosed with cancer and is now undergoing chemotherapy.  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  She was started on Prozac 20 mg once daily during her last visit to help with her feelings of being overwhelmed.  Notes some improvement, but not complete resolution.  Feels she would benefit from increased Prozac to 40 mg once daily.  No SI/HI      4. Strain of neck muscle, initial encounter    Other orders  -     FLUoxetine (PROzac) 40 MG capsule; Take 1 capsule by mouth Daily.  Dispense: 30 capsule; Refill: 1  -     cyclobenzaprine (FLEXERIL) 5 MG tablet; Take 1 tablet by mouth 3 (Three) Times a Day As Needed for Muscle Spasms.  Dispense: 15 tablet; Refill: 0  -     atenolol (Tenormin) 25 MG tablet; Take one tablet by mouth daily. Check pulse before taking, if pulse less than 60 bpm do not take atenolol  Dispense: 30 tablet; Refill: 1    BMI is >= 25 and <30. (Overweight) The following options were offered after discussion;: exercise counseling/recommendations and nutrition counseling/recommendations    I spent 30 minutes caring for Norma on this date of service. This time includes time spent by me in the following activities:preparing for the visit, performing a medically appropriate examination and/or evaluation , counseling and educating the  patient/family/caregiver, ordering medications, tests, or procedures and documenting information in the medical record      Follow Up  Return for Next scheduled follow up.    Concepción Shea, APRN

## 2023-03-27 NOTE — ASSESSMENT & PLAN NOTE
Presented 4 weeks ago with feelings of palpitations in her chest for the last couple of months.  She had the same issue many years ago at which time she underwent full cardiac evaluation with out abnormal findings.  She has eliminated caffeine which has provided little benefit.  In the past she has had good benefit with initiation of beta-blocker as well as eliminating caffeine.  -We will initiate atenolol 25 mg once daily for palpitations.  She has been advised to check pulse before taking medication, if heart rate less than 60 hold atenolol dose.  -If significant side effects such as extreme fatigue or shortness of breath occur she is to stop medication

## 2023-03-27 NOTE — ASSESSMENT & PLAN NOTE
Situational anxiety that began when her  months diagnosed with cancer and is now undergoing chemotherapy.  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  She was started on Prozac 20 mg once daily during her last visit to help with her feelings of being overwhelmed.  Notes some improvement, but not complete resolution.  Feels she would benefit from increased Prozac to 40 mg once daily.  No SI/HI

## 2023-03-27 NOTE — ASSESSMENT & PLAN NOTE
Well-controlled.  She also reports readings at goal below 140/80 at home.  Continue current verapamil 120 mg daily, lisinopril 20 mg once daily and triamterene-HCTZ once daily dosing

## 2023-04-04 RX ORDER — TRIAMTERENE AND HYDROCHLOROTHIAZIDE 37.5; 25 MG/1; MG/1
CAPSULE ORAL
Qty: 30 CAPSULE | Refills: 0 | Status: SHIPPED | OUTPATIENT
Start: 2023-04-04

## 2023-04-24 ENCOUNTER — OFFICE VISIT (OUTPATIENT)
Dept: FAMILY MEDICINE CLINIC | Facility: CLINIC | Age: 78
End: 2023-04-24
Payer: MEDICARE

## 2023-04-24 VITALS
OXYGEN SATURATION: 97 % | WEIGHT: 147.8 LBS | SYSTOLIC BLOOD PRESSURE: 120 MMHG | TEMPERATURE: 97.2 F | RESPIRATION RATE: 16 BRPM | DIASTOLIC BLOOD PRESSURE: 72 MMHG | BODY MASS INDEX: 27.2 KG/M2 | HEART RATE: 81 BPM | HEIGHT: 62 IN

## 2023-04-24 DIAGNOSIS — R20.0 NUMBNESS OF RIGHT FOOT: ICD-10-CM

## 2023-04-24 DIAGNOSIS — E11.9 CONTROLLED TYPE 2 DIABETES MELLITUS WITHOUT COMPLICATION, WITHOUT LONG-TERM CURRENT USE OF INSULIN: Primary | ICD-10-CM

## 2023-04-24 DIAGNOSIS — I10 PRIMARY HYPERTENSION: ICD-10-CM

## 2023-04-24 DIAGNOSIS — E78.2 MIXED HYPERLIPIDEMIA: ICD-10-CM

## 2023-04-24 DIAGNOSIS — R00.2 PALPITATIONS: ICD-10-CM

## 2023-04-24 NOTE — ASSESSMENT & PLAN NOTE
Excellent control, /72 in office today.  Continue current medications including lisinopril 20 mg once daily and Dyazide 37.5-25 mg daily

## 2023-04-24 NOTE — ASSESSMENT & PLAN NOTE
A1c 6.7% 4 weeks ago.  Checks her glucose at home once weekly, reporting readings are never over 150.  Utilizing metformin 500 mg once daily with good benefit

## 2023-04-24 NOTE — ASSESSMENT & PLAN NOTE
She reports intermittent numbness and tingling of her right foot after changing the shoes that she exercises gym several days weekly.  On physical exam no decreased sensation or reflex.  Dorsal and pedal pulses strong and palpable.  Advised her to revert back to her old tennis shoes and utilize ibuprofen.  If no improvement return for further evaluation

## 2023-04-24 NOTE — PROGRESS NOTES
Office Note     Name: Norma Fletcher    : 1945     MRN: [id number]     Chief Complaint  Follow-up    Subjective     History of Present Illness:  Norma Fletcher is a 77 y.o. female who presents today for follow-up after initiation of atenolol for palpitations.  This has been a recurrent problem for her over the years.  EKG obtained at time of presentation was normal.  She was initially started on fluoxetine, thinking perhaps her feeling of palpitation was anxiety related, however her symptoms persisted.  Last visit atenolol 25 mg once daily was initiated as it had been in the past.  Today she reports she is only taking atenolol on days her heart rate is 80 or above which is approximately 3 to 4 days weekly.  She continues to take her fluoxetine 40 mg daily as well.  Pressure with excellent control, 120/72 in office today.  She continues to walk 3 to 4 days/week at the [facility].  Diabetes well controlled with last A1c 6.7%.   Review of Systems   Constitutional: Negative for activity change, appetite change and fatigue.   Respiratory: Positive for chest tightness. Negative for cough, shortness of breath and wheezing.    Cardiovascular: Negative for chest pain, palpitations and leg swelling.   Gastrointestinal: Negative for abdominal pain, constipation, diarrhea, nausea, vomiting and indigestion.   Endocrine: Negative for polydipsia and polyuria.   Musculoskeletal: Positive for arthralgias. Negative for myalgias.   Skin: Negative for rash and bruise.   Neurological: Positive for numbness. Negative for dizziness, weakness, light-headedness and headache.        Right foot numbness and tingling   Psychiatric/Behavioral: Negative for sleep disturbance and depressed mood. The patient is not nervous/anxious.        Objective     Past Medical History:   Diagnosis Date   • Dyslipidemia    • Dysphagia     Recurrent dysphagia secondary to Schatzki's ring; EGD 2014, 2015, Dr. Nicholson   • Essential (primary)  "hypertension    • Fibrocystic breast disease     status post stereotactic biopsy 9/15/09   • Hepatitis     A, B, and C, status post GI consult with no evidence of active disease   • Hypertension    • Iron deficiency    • Mixed hyperlipidemia    • Rash and other nonspecific skin eruption    • Synovial cyst of popliteal space (Baker), left knee    • Type 2 diabetes mellitus without complications    • Unilateral primary osteoarthritis, left knee      Past Surgical History:   Procedure Laterality Date   • CATARACT EXTRACTION, BILATERAL  02/2022   • COLONOSCOPY      multiple all negative, most recently 7/15/2020 completely normal, Dr. Nicholson, recommendation for five-year followup in the year 2025     Family History   Problem Relation Age of Onset   • Breast cancer Other    • Colon cancer Other        Vital Signs  /72 (BP Location: Right arm, Patient Position: Sitting, Cuff Size: Adult)   Pulse 81   Temp 97.2 °F (36.2 °C) (Temporal)   Resp 16   Ht 157.5 cm (62\")   Wt 67 kg (147 lb 12.8 oz)   SpO2 97%   BMI 27.03 kg/m²   Estimated body mass index is 27.03 kg/m² as calculated from the following:    Height as of this encounter: 157.5 cm (62\").    Weight as of this encounter: 67 kg (147 lb 12.8 oz).    Physical Exam  Vitals reviewed.   Constitutional:       Appearance: Normal appearance.   HENT:      Head: Normocephalic and atraumatic.      Right Ear: Tympanic membrane, ear canal and external ear normal.      Left Ear: Tympanic membrane, ear canal and external ear normal.      Mouth/Throat:      Mouth: Mucous membranes are moist.      Pharynx: Oropharynx is clear.   Eyes:      Conjunctiva/sclera: Conjunctivae normal.   Cardiovascular:      Rate and Rhythm: Normal rate and regular rhythm.      Pulses: Normal pulses.      Heart sounds: Normal heart sounds.   Pulmonary:      Effort: Pulmonary effort is normal.      Breath sounds: Normal breath sounds.   Musculoskeletal:         General: No tenderness.      " Cervical back: Normal range of motion and neck supple.      Right lower leg: No edema.      Left lower leg: No edema.   Skin:     General: Skin is warm and dry.   Neurological:      General: No focal deficit present.      Mental Status: She is alert and oriented to person, place, and time.   Psychiatric:         Mood and Affect: Mood normal.         Behavior: Behavior normal.         Thought Content: Thought content normal.         Judgment: Judgment normal.          POCT Results (if applicable):  Results for orders placed or performed in visit on 02/27/23   Hemoglobin A1c    Specimen: Arm, Left; Blood    Blood  Release to forrest   Result Value Ref Range    Hemoglobin A1C 6.7 (H) 4.8 - 5.6 %   Basic Metabolic Panel    Specimen: Arm, Left; Blood    Blood  Release to forrest   Result Value Ref Range    Glucose CANCELED mg/dL    BUN 10 8 - 27 mg/dL    Creatinine 0.61 0.57 - 1.00 mg/dL    EGFR Result 93 >59 mL/min/1.73    BUN/Creatinine Ratio 16 12 - 28    Sodium 138 134 - 144 mmol/L    Potassium CANCELED mmol/L    Chloride 103 96 - 106 mmol/L    Total CO2 21 20 - 29 mmol/L    Calcium 8.9 8.7 - 10.3 mg/dL   TSH Rfx On Abnormal To Free T4    Specimen: Arm, Left; Blood    Blood  Release to forrest   Result Value Ref Range    TSH 1.640 0.450 - 4.500 uIU/mL            Assessment and Plan     Diagnoses and all orders for this visit:    1. Controlled type 2 diabetes mellitus without complication, without long-term current use of insulin (Primary)  Assessment & Plan:  A1c 6.7% 4 weeks ago.  Checks her glucose at home once weekly, reporting readings are never over 150.  Utilizing metformin 500 mg once daily with good benefit      2. Primary hypertension  Assessment & Plan:  Excellent control, /72 in office today.  Continue current medications including lisinopril 20 mg once daily and Dyazide 37.5-25 mg daily      3. Palpitations  Assessment & Plan:  Presented 8 weeks ago with feelings of palpitations in her chest for the last  couple months.  He had the same issue many years ago at which time she underwent full cardiac evaluation without abnormal findings.  She initially eliminated caffeine which has provided little benefit.  We also treated her anxiety with fluoxetine 40 mg daily without improvement in palpitations.  In the past she had received good benefit with initiation of beta-blocker.  4 weeks ago atenolol was initiated.  Taking atenolol 2-3 times weekly with excellent benefit.  Reports if heart rate is less than 70 she holds dose for that day.      4. Mixed hyperlipidemia  Assessment & Plan:  Well-controlled on current pravastatin 40 mg daily dosing      5. Numbness of right foot  Assessment & Plan:  She reports intermittent numbness and tingling of her right foot after changing the shoes that she exercises gym several days weekly.  On physical exam no decreased sensation or reflex.  Dorsal and pedal pulses strong and palpable.  Advised her to revert back to her old tennis shoes and utilize ibuprofen.  If no improvement return for further evaluation      BMI is >= 25 and <30. (Overweight) The following options were offered after discussion;: exercise counseling/recommendations and nutrition counseling/recommendations    I spent 30 minutes caring for Norma on this date of service. This time includes time spent by me in the following activities:preparing for the visit, performing a medically appropriate examination and/or evaluation , counseling and educating the patient/family/caregiver and documenting information in the medical record    Follow Up  No follow-ups on file.    BRANDON Crabtree

## 2023-04-24 NOTE — ASSESSMENT & PLAN NOTE
Presented 8 weeks ago with feelings of palpitations in her chest for the last couple months.  He had the same issue many years ago at which time she underwent full cardiac evaluation without abnormal findings.  She initially eliminated caffeine which has provided little benefit.  We also treated her anxiety with fluoxetine 40 mg daily without improvement in palpitations.  In the past she had received good benefit with initiation of beta-blocker.  4 weeks ago atenolol was initiated.  Taking atenolol 2-3 times weekly with excellent benefit.  Reports if heart rate is less than 70 she holds dose for that day.

## 2023-05-11 DIAGNOSIS — I10 HYPERTENSION, UNSPECIFIED TYPE: ICD-10-CM

## 2023-05-13 DIAGNOSIS — E78.2 MODERATE MIXED HYPERLIPIDEMIA NOT REQUIRING STATIN THERAPY: ICD-10-CM

## 2023-05-15 RX ORDER — PRAVASTATIN SODIUM 40 MG
TABLET ORAL
Qty: 90 TABLET | Refills: 0 | Status: SHIPPED | OUTPATIENT
Start: 2023-05-15

## 2023-05-22 ENCOUNTER — OFFICE VISIT (OUTPATIENT)
Dept: FAMILY MEDICINE CLINIC | Facility: CLINIC | Age: 78
End: 2023-05-22
Payer: MEDICARE

## 2023-05-22 VITALS
DIASTOLIC BLOOD PRESSURE: 64 MMHG | OXYGEN SATURATION: 98 % | WEIGHT: 147.13 LBS | HEIGHT: 55 IN | BODY MASS INDEX: 34.05 KG/M2 | RESPIRATION RATE: 18 BRPM | HEART RATE: 53 BPM | SYSTOLIC BLOOD PRESSURE: 112 MMHG | TEMPERATURE: 97.4 F

## 2023-05-22 DIAGNOSIS — F41.8 SITUATIONAL ANXIETY: Primary | ICD-10-CM

## 2023-05-22 DIAGNOSIS — Z00.00 ENCOUNTER FOR SUBSEQUENT ANNUAL WELLNESS VISIT (AWV) IN MEDICARE PATIENT: ICD-10-CM

## 2023-05-22 DIAGNOSIS — E11.9 CONTROLLED TYPE 2 DIABETES MELLITUS WITHOUT COMPLICATION, WITHOUT LONG-TERM CURRENT USE OF INSULIN: ICD-10-CM

## 2023-05-22 DIAGNOSIS — E78.2 MIXED HYPERLIPIDEMIA: ICD-10-CM

## 2023-05-22 DIAGNOSIS — I10 PRIMARY HYPERTENSION: ICD-10-CM

## 2023-05-22 DIAGNOSIS — R00.2 PALPITATIONS: ICD-10-CM

## 2023-05-22 DIAGNOSIS — N81.10 VAGINAL PROLAPSE: ICD-10-CM

## 2023-05-22 PROBLEM — G44.209 ACUTE NON INTRACTABLE TENSION-TYPE HEADACHE: Status: RESOLVED | Noted: 2023-02-27 | Resolved: 2023-05-22

## 2023-05-22 NOTE — PROGRESS NOTES
Venipuncture Blood Specimen Collection  Venipuncture performed in left arm by Anne Hernandez MA with good hemostasis. Patient tolerated the procedure well without complications.   05/22/23   Anne Hernandez MA

## 2023-05-22 NOTE — PROGRESS NOTES
The ABCs of the Annual Wellness Visit  Subsequent Medicare Wellness Visit    Chief Complaint   Patient presents with   • Annual Exam      Subjective    History of Present Illness:  Norma Fletcher is a 77 y.o. female who presents for a Subsequent Medicare Wellness Visit.    The following portions of the patient's history were reviewed and   updated as appropriate: allergies, current medications, past family history, past medical history, past social history, past surgical history and problem list.    Compared to one year ago, the patient feels her physical   health is the same.    Compared to one year ago, the patient feels her mental   health is the same.    Recent Hospitalizations:  She was not admitted to the hospital during the last year.       Current Medical Providers:  Patient Care Team:  Concepción Shea APRN as PCP - General (Family Medicine)    Outpatient Medications Prior to Visit   Medication Sig Dispense Refill   • aspirin 81 MG EC tablet Asprin Ec Low Dose 81 mg tablet,delayed release   Take 1 tablet every day by oral route.     • atenolol (Tenormin) 25 MG tablet Take one tablet by mouth daily. Check pulse before taking, if pulse less than 60 bpm do not take atenolol 30 tablet 1   • dorzolamide-timolol (COSOPT) 22.3-6.8 MG/ML ophthalmic solution instill 1 drop into each eye twice daily     • esomeprazole (nexIUM) 20 MG capsule Take 1 capsule by mouth Every Morning Before Breakfast.     • FLUoxetine (PROzac) 40 MG capsule Take 1 capsule by mouth Daily. 30 capsule 1   • metFORMIN (Glucophage) 500 MG tablet Take 1-1/2 tablets twice daily 270 tablet 1   • Multiple Vitamins-Minerals (b complex-C-E-zinc) tablet Take 1 tablet by mouth Daily.     • pravastatin (PRAVACHOL) 40 MG tablet Take 1 tablet by mouth once daily 90 tablet 0   • triamterene-hydrochlorothiazide (DYAZIDE) 37.5-25 MG per capsule Take 1 capsule by mouth once daily in the morning 30 capsule 0   • verapamil SR (CALAN-SR) 120 MG CR tablet Take 1  "tablet by mouth once daily 90 tablet 0   • lisinopril (PRINIVIL,ZESTRIL) 20 MG tablet Take 1 tablet by mouth Daily. 90 tablet 0   • cyclobenzaprine (FLEXERIL) 5 MG tablet Take 1 tablet by mouth 3 (Three) Times a Day As Needed for Muscle Spasms. 15 tablet 0     No facility-administered medications prior to visit.       No opioid medication identified on active medication list. I have reviewed chart for other potential  high risk medication/s and harmful drug interactions in the elderly.          Aspirin is on active medication list. Aspirin use is indicated based on review of current medical condition/s. Pros and cons of this therapy have been discussed today. Benefits of this medication outweigh potential harm.  Patient has been encouraged to continue taking this medication.  .      Patient Active Problem List   Diagnosis   • Acute viral syndrome   • Palpitations   • Controlled type 2 diabetes mellitus without complication, without long-term current use of insulin   • Primary hypertension   • Mixed hyperlipidemia   • Situational anxiety   • Numbness of right foot   • Encounter for subsequent annual wellness visit (AWV) in Medicare patient   • Vaginal prolapse     Advance Care Planning  Advance Directive is not on file.  ACP discussion was held with the patient during this visit. Patient does not have an advance directive, information provided.          Objective    Vitals:    05/22/23 0941   BP: 112/64   BP Location: Right arm   Patient Position: Sitting   Cuff Size: Adult   Pulse: 53   Resp: 18   Temp: 97.4 °F (36.3 °C)   TempSrc: Temporal   SpO2: 98%   Weight: 66.7 kg (147 lb 2 oz)   Height: 62 cm (24.41\")     Estimated body mass index is 173.62 kg/m² as calculated from the following:    Height as of this encounter: 62 cm (24.41\").    Weight as of this encounter: 66.7 kg (147 lb 2 oz).    Class 3 Severe Obesity (BMI >=40). Obesity-related health conditions include the following: hypertension, diabetes mellitus and " dyslipidemias. Obesity is unchanged. BMI is is above average; BMI management plan is completed. We discussed portion control and increasing exercise.      Does the patient have evidence of cognitive impairment? No    Physical Exam  Lab Results   Component Value Date    CHLPL 154 2023    TRIG 101 2023    HDL 37 (L) 2023    LDL 98 2023    VLDL 19 2023    HGBA1C 6.5 (H) 2023            HEALTH RISK ASSESSMENT    Smoking Status:  Social History     Tobacco Use   Smoking Status Never   Smokeless Tobacco Never     Alcohol Consumption:  Social History     Substance and Sexual Activity   Alcohol Use Never     Fall Risk Screen:    STEADI Fall Risk Assessment was completed, and patient is at LOW risk for falls.Assessment completed on:2023    Depression Screenin/22/2023     9:40 AM   PHQ-2/PHQ-9 Depression Screening   Little Interest or Pleasure in Doing Things 0-->not at all   Feeling Down, Depressed or Hopeless 0-->not at all   PHQ-9: Brief Depression Severity Measure Score 0       Health Habits and Functional and Cognitive Screenin/22/2023     9:40 AM   Functional & Cognitive Status   Do you have difficulty preparing food and eating? No   Do you have difficulty bathing yourself, getting dressed or grooming yourself? No   Do you have difficulty using the toilet? No   Do you have difficulty moving around from place to place? No   Do you have trouble with steps or getting out of a bed or a chair? No   Current Diet Well Balanced Diet   Dental Exam Up to date   Eye Exam Up to date   Exercise (times per week) 3 times per week   Current Exercises Include Walking   Do you need help using the phone?  No   Are you deaf or do you have serious difficulty hearing?  No   Do you need help with transportation? No   Do you need help shopping? No   Do you need help preparing meals?  No   Do you need help with housework?  No   Do you need help with laundry? No   Do you need help taking  your medications? No   Do you need help managing money? No   Do you ever drive or ride in a car without wearing a seat belt? No       Age-appropriate Screening Schedule:  Refer to the list below for future screening recommendations based on patient's age, sex and/or medical conditions. Orders for these recommended tests are listed in the plan section. The patient has been provided with a written plan.    Health Maintenance   Topic Date Due   • ZOSTER VACCINE (3 of 3) 03/21/2023   • DXA SCAN  05/12/2023   • INFLUENZA VACCINE  08/01/2023   • HEMOGLOBIN A1C  11/22/2023   • DIABETIC EYE EXAM  02/16/2024   • ANNUAL WELLNESS VISIT  05/22/2024   • LIPID PANEL  05/22/2024   • URINE MICROALBUMIN  05/22/2024   • TDAP/TD VACCINES (2 - Td or Tdap) 10/30/2024   • HEPATITIS C SCREENING  Completed   • COVID-19 Vaccine  Completed   • Pneumococcal Vaccine 65+  Completed   • COLORECTAL CANCER SCREENING  Discontinued              Assessment & Plan   CMS Preventative Services Quick Reference  Risk Factors Identified During Encounter  None Identified  The above risks/problems have been discussed with the patient.  Follow up actions/plans if indicated are seen below in the Assessment/Plan Section.  Pertinent information has been shared with the patient in the After Visit Summary.    Diagnoses and all orders for this visit:    1. Situational anxiety (Primary)  Assessment & Plan:  Situational anxiety (her  was diagnosed with cancer and began chemotherapy.  He is a full-time caretaker with frequent trips to [place] for treatments.  Started on Prozac 40 mg once daily approximately 5 months ago with good benefit.  She tells me she will soon have assistance in caring for her  as they are moving to [place] to live with their son      2. Primary hypertension  Assessment & Plan:  Checks at home three times/week. Reports good control. Excellent BP in office today, 112/64      3. Palpitations  Assessment & Plan:  Patient notes  resolution of palpitations with initiation of atenolol.  Reports she is not taking the medication daily, she is checking her heart rate regularly and if it is less than 70 she holds the dose.      4. Mixed hyperlipidemia  Assessment & Plan:  Historically well controlled on daily pravastatin 40 mg, rechecking lipid panel today      5. Controlled type 2 diabetes mellitus without complication, without long-term current use of insulin  Assessment & Plan:  Glucose ranging from 111 to 139 at home. Checking AM fasting a few days/week. Had eye exam last month, getting every three months. No signs of retinopathy.  Up-to-date diabetic foot exam with abnormality    Orders:  -     Lipid Panel; Future  -     MicroAlbumin, Urine, Random - Urine, Clean Catch; Future  -     Hemoglobin A1c; Future  -     Hemoglobin A1c  -     MicroAlbumin, Urine, Random - Urine, Clean Catch  -     Lipid Panel    6. Encounter for subsequent annual wellness visit (AWV) in Medicare patient  Assessment & Plan:  Presents today for annual subsequent wellness visit.  She is currently up-to-date on colonoscopy and mammogram screening.  Discussed DEXA scan but she declines at this time.  Up-to-date on diabetic eye and foot exam without signs of retinopathy or neuropathy.  She has no complaints or concerns today    Orders:  -     Hepatitis C Antibody; Future  -     Hepatitis C Antibody    7. Vaginal prolapse  Assessment & Plan:  Continues to be followed by OB/GYN with pessary use        Follow Up:   Return in about 4 months (around 9/22/2023).     An After Visit Summary and PPPS were made available to the patient.

## 2023-05-22 NOTE — ASSESSMENT & PLAN NOTE
Glucose ranging from 111 to 139 at home. Checking AM fasting a few days/week. Had eye exam last month, getting every three months. No signs of retinopathy.  Up-to-date diabetic foot exam with abnormality

## 2023-05-23 LAB
CHOLEST SERPL-MCNC: 154 MG/DL (ref 100–199)
HBA1C MFR BLD: 6.5 % (ref 4.8–5.6)
HCV IGG SERPL QL IA: NON REACTIVE
HDLC SERPL-MCNC: 37 MG/DL
LDLC SERPL CALC-MCNC: 98 MG/DL (ref 0–99)
MICROALBUMIN UR-MCNC: 5.2 UG/ML
TRIGL SERPL-MCNC: 101 MG/DL (ref 0–149)
VLDLC SERPL CALC-MCNC: 19 MG/DL (ref 5–40)

## 2023-05-24 DIAGNOSIS — I10 HYPERTENSION, UNSPECIFIED TYPE: ICD-10-CM

## 2023-05-24 RX ORDER — LISINOPRIL 20 MG/1
TABLET ORAL
Qty: 90 TABLET | Refills: 1 | Status: SHIPPED | OUTPATIENT
Start: 2023-05-24

## 2023-05-24 NOTE — ASSESSMENT & PLAN NOTE
Presents today for annual subsequent wellness visit.  She is currently up-to-date on colonoscopy and mammogram screening.  Discussed DEXA scan but she declines at this time.  Up-to-date on diabetic eye and foot exam without signs of retinopathy or neuropathy.  She has no complaints or concerns today

## 2023-05-24 NOTE — TELEPHONE ENCOUNTER
Called and spoke with patient and advised her of lab results also if she has any questions or concerns to call the office and she verbalized understanding

## 2023-05-24 NOTE — ASSESSMENT & PLAN NOTE
Patient notes resolution of palpitations with initiation of atenolol.  Reports she is not taking the medication daily, she is checking her heart rate regularly and if it is less than 70 she holds the dose.

## 2023-05-24 NOTE — ASSESSMENT & PLAN NOTE
Situational anxiety (her  was diagnosed with cancer and began chemotherapy.  He is a full-time caretaker with frequent trips to [place] for treatments.  Started on Prozac 40 mg once daily approximately 5 months ago with good benefit.  She tells me she will soon have assistance in caring for her  as they are moving to [place] to live with their son

## 2023-05-24 NOTE — TELEPHONE ENCOUNTER
----- Message from BRANDON Crabtree sent at 5/24/2023  4:15 PM EDT -----  Please let ms. Fletcher know her labs are satisfactory. Her A1C is 6.5% which is even better than it was two months ago.

## 2023-06-01 RX ORDER — FLUOXETINE HYDROCHLORIDE 40 MG/1
40 CAPSULE ORAL DAILY
Qty: 30 CAPSULE | Refills: 0 | Status: SHIPPED | OUTPATIENT
Start: 2023-06-01

## 2023-06-05 RX ORDER — TRIAMTERENE AND HYDROCHLOROTHIAZIDE 37.5; 25 MG/1; MG/1
CAPSULE ORAL
Qty: 30 CAPSULE | Refills: 0 | Status: SHIPPED | OUTPATIENT
Start: 2023-06-05

## 2023-08-07 ENCOUNTER — OFFICE VISIT (OUTPATIENT)
Dept: FAMILY MEDICINE CLINIC | Facility: CLINIC | Age: 78
End: 2023-08-07
Payer: MEDICARE

## 2023-08-07 VITALS
DIASTOLIC BLOOD PRESSURE: 70 MMHG | TEMPERATURE: 97.8 F | WEIGHT: 150 LBS | SYSTOLIC BLOOD PRESSURE: 122 MMHG | BODY MASS INDEX: 26.58 KG/M2 | HEIGHT: 63 IN

## 2023-08-07 DIAGNOSIS — E11.9 CONTROLLED TYPE 2 DIABETES MELLITUS WITHOUT COMPLICATION, WITHOUT LONG-TERM CURRENT USE OF INSULIN: ICD-10-CM

## 2023-08-07 DIAGNOSIS — I10 HYPERTENSION, UNSPECIFIED TYPE: ICD-10-CM

## 2023-08-07 DIAGNOSIS — I10 PRIMARY HYPERTENSION: Primary | ICD-10-CM

## 2023-08-07 DIAGNOSIS — F41.8 SITUATIONAL ANXIETY: ICD-10-CM

## 2023-08-07 DIAGNOSIS — E78.2 MIXED HYPERLIPIDEMIA: ICD-10-CM
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
## 2023-08-07 PROCEDURE — 3074F SYST BP LT 130 MM HG: CPT | Performed by: FAMILY MEDICINE

## 2023-08-07 PROCEDURE — 3008F BODY MASS INDEX DOCD: CPT | Performed by: FAMILY MEDICINE

## 2023-08-07 PROCEDURE — 3078F DIAST BP <80 MM HG: CPT | Performed by: FAMILY MEDICINE

## 2023-08-07 PROCEDURE — 1159F MED LIST DOCD IN RCRD: CPT | Performed by: FAMILY MEDICINE

## 2023-08-07 PROCEDURE — 1160F RVW MEDS BY RX/DR IN RCRD: CPT | Performed by: FAMILY MEDICINE

## 2023-08-07 PROCEDURE — 99214 OFFICE O/P EST MOD 30 MIN: CPT | Performed by: FAMILY MEDICINE

## 2023-08-07 RX ORDER — TRIAMTERENE AND HYDROCHLOROTHIAZIDE 37.5; 25 MG/1; MG/1
CAPSULE ORAL
Qty: 30 CAPSULE | Refills: 0 | Status: SHIPPED | OUTPATIENT
Start: 2023-08-07 | End: 2023-08-07 | Stop reason: SDUPTHER

## 2023-08-07 RX ORDER — LANOLIN ALCOHOL/MO/W.PET/CERES
400 CREAM (GRAM) TOPICAL DAILY
COMMUNITY

## 2023-08-07 RX ORDER — PRAVASTATIN SODIUM 40 MG
40 TABLET ORAL DAILY
Qty: 90 TABLET | Refills: 1 | Status: SHIPPED | OUTPATIENT
Start: 2023-08-07

## 2023-08-07 RX ORDER — TRIAMTERENE AND HYDROCHLOROTHIAZIDE 37.5; 25 MG/1; MG/1
1 CAPSULE ORAL EVERY MORNING
Qty: 90 CAPSULE | Refills: 1 | Status: SHIPPED | OUTPATIENT
Start: 2023-08-07

## 2023-08-07 RX ORDER — ATENOLOL 25 MG/1
TABLET ORAL
Qty: 30 TABLET | Refills: 1 | Status: SHIPPED | OUTPATIENT
Start: 2023-08-07

## 2023-08-07 RX ORDER — OXYQUINOLINE SULFATE AND SODIUM LAURYL SULFATE .25; .1 MG/G; MG/G
JELLY VAGINAL
COMMUNITY
Start: 2023-07-27

## 2023-08-07 RX ORDER — FERROUS SULFATE TAB EC 324 MG (65 MG FE EQUIVALENT) 324 (65 FE) MG
324 TABLET DELAYED RESPONSE ORAL
COMMUNITY

## 2023-08-07 RX ORDER — FLUOXETINE HYDROCHLORIDE 40 MG/1
40 CAPSULE ORAL DAILY
Qty: 90 CAPSULE | Refills: 1 | Status: SHIPPED | OUTPATIENT
Start: 2023-08-07

## 2023-08-07 RX ORDER — PHENOL 1.4 %
600 AEROSOL, SPRAY (ML) MUCOUS MEMBRANE DAILY
COMMUNITY

## 2023-08-07 RX ORDER — MELOXICAM 15 MG/1
15 TABLET ORAL DAILY
Qty: 30 TABLET | Refills: 0 | Status: SHIPPED | OUTPATIENT
Start: 2023-08-07

## 2023-08-07 RX ORDER — LISINOPRIL 20 MG/1
20 TABLET ORAL DAILY
Qty: 90 TABLET | Refills: 1 | Status: SHIPPED | OUTPATIENT
Start: 2023-08-07

## 2023-08-07 NOTE — ASSESSMENT & PLAN NOTE
Lipid abnormalities are unchanged.  Nutritional counseling was provided. and Pharmacotherapy as ordered.  Lipids will be reassessed in 3 months.  FLP will be ordered at follow up

## 2023-08-07 NOTE — ASSESSMENT & PLAN NOTE
Diabetes is improving with lifestyle modifications.   Continue current treatment regimen.  Reminded to bring in blood sugar diary at next visit.  Diabetes will be reassessed in 3 months.  Up to date on health maintenance, continue Metformin only

## 2023-08-07 NOTE — PROGRESS NOTES
Subjective     Chief Complaint  No chief complaint on file.    Subjective          Norma Fletcher is a 77 y.o. female who presents today to [facility] for initial evaluation .    HPI:   History of Present Illness      Ms. Fletcher is a very pleasant 77-year-old female who presents today to establish care with a primary care physician.    She has a past medical history of hypertension, hyperlipidemia, type 2 diabetes, situational anxiety.      For her hypertension she is prescribed atenolol 25 mg daily, lisinopril 20 mg daily, triamterene/hydrochlorothiazide 37.5-25 mg daily and verapamil 120 mg daily.  Her blood pressure is very well controlled in office today at 122/70    For her diabetes her current prescription is metformin 500 mg daily, her hemoglobin A1c was 6.5% in May    About 4 weeks ago,. She had a fall on the stairs where she was walking in flip-flops and turned to go back down the stairs and lost her footing falling hard on her arm.  She was seen in urgent care and x-rays were negative for fractures.  She was diagnosed with a hematoma of the arm and had some swelling in the thumb.    The following portions of the patient's history were reviewed and updated as appropriate: allergies, current medications, past family history, past medical history, past social history, past surgical history and problem list.    Objective     Objective     Allergy:   No Known Allergies     Current Medications:   Current Outpatient Medications   Medication Sig Dispense Refill    aspirin 81 MG EC tablet Asprin Ec Low Dose 81 mg tablet,delayed release   Take 1 tablet every day by oral route.      atenolol (Tenormin) 25 MG tablet Take one tablet by mouth daily. Check pulse before taking, if pulse less than 60 bpm do not take atenolol 30 tablet 1    calcium carbonate (OS-KEYANNA) 600 MG tablet Take 1 tablet by mouth Daily.      dorzolamide-timolol (COSOPT) 22.3-6.8 MG/ML ophthalmic solution instill  1 drop into each eye twice daily      esomeprazole (nexIUM) 20 MG capsule Take 1 capsule by mouth Every Morning Before Breakfast.      ferrous sulfate 324 (65 Fe) MG tablet delayed-release EC tablet Take 1 tablet by mouth Daily With Breakfast.      FLUoxetine (PROzac) 40 MG capsule Take 1 capsule by mouth Daily. 90 capsule 1    lisinopril (PRINIVIL,ZESTRIL) 20 MG tablet Take 1 tablet by mouth Daily. 90 tablet 1    Magnesium Oxide -Mg Supplement 400 (240 Mg) MG tablet Take 1 tablet by mouth Daily.      metFORMIN (GLUCOPHAGE) 500 MG tablet TAKE 1 & 1/2 (ONE & ONE-HALF) TABLETS BY MOUTH TWICE DAILY 270 tablet 1    Multiple Vitamins-Minerals (b complex-C-E-zinc) tablet Take 1 tablet by mouth Daily.      pravastatin (PRAVACHOL) 40 MG tablet Take 1 tablet by mouth Daily. 90 tablet 1    triamterene-hydrochlorothiazide (DYAZIDE) 37.5-25 MG per capsule Take 1 capsule by mouth Every Morning. 90 capsule 1    Trimo-Hanley 0.025-0.01 % gel INSERT 1 GRAM APPLICATOR IN VAGINA TWICE A WEEK AS NEEDED.      verapamil SR (CALAN-SR) 120 MG CR tablet Take 1 tablet by mouth Daily. 90 tablet 1    vitamin D3 125 MCG (5000 UT) capsule capsule Take 1 capsule by mouth Daily.      meloxicam (Mobic) 15 MG tablet Take 1 tablet by mouth Daily. 30 tablet 0     No current facility-administered medications for this visit.       Past Medical History:  Past Medical History:   Diagnosis Date    Dyslipidemia     Dysphagia     Recurrent dysphagia secondary to Schatzki's ring; EGD 7/30/2014, 12/9/2015, Dr. Nicholson    Essential (primary) hypertension     Fibrocystic breast disease     status post stereotactic biopsy 9/15/09    Hepatitis     A, B, and C, status post GI consult with no evidence of active disease    Hypertension     Iron deficiency     Mixed hyperlipidemia     Rash and other nonspecific skin eruption     Synovial cyst of popliteal space ([place]), left knee     Type 2 diabetes mellitus without complications     Unilateral primary osteoarthritis,  "left knee        Past Surgical History:  Past Surgical History:   Procedure Laterality Date    CATARACT EXTRACTION, BILATERAL  02/2022    COLONOSCOPY      multiple all negative, most recently 7/15/2020 completely normal, Dr. Nicholson, recommendation for five-year followup in the year 2025       Social History:  Social History     Socioeconomic History    Marital status:    Tobacco Use    Smoking status: Never    Smokeless tobacco: Never   Vaping Use    Vaping Use: Never used   Substance and Sexual Activity    Alcohol use: Never    Drug use: Never       Family History:  Family History   Problem Relation Age of Onset    Breast cancer Other     Colon cancer Other          Vital Signs:   /70   Temp 97.8 øF (36.6 øC) (Infrared)   Ht 160 cm (63\")   Wt 68 kg (150 lb)   BMI 26.57 kg/mý      Physical Exam:  Physical Exam  Constitutional:       Appearance: She is normal weight.   HENT:      Right Ear: Tympanic membrane normal.   Cardiovascular:      Rate and Rhythm: Normal rate and regular rhythm.      Pulses: Normal pulses.      Heart sounds: No murmur heard.  Pulmonary:      Effort: Pulmonary effort is normal. No respiratory distress.      Breath sounds: No wheezing.   Abdominal:      General: Abdomen is flat. Bowel sounds are normal.   Musculoskeletal:         General: No swelling or tenderness.      Cervical back: Normal range of motion.   Neurological:      General: No focal deficit present.      Mental Status: She is alert. Mental status is at baseline.   Psychiatric:         Mood and Affect: Mood normal.         Behavior: Behavior normal.         Thought Content: Thought content normal.         Judgment: Judgment normal.             PHQ-9 Score  PHQ-9 Total Score:       Lab Review  Office Visit on 05/22/2023   Component Date Value Ref Range Status    Hemoglobin A1C 05/22/2023 6.5 (H)  4.8 - 5.6 % Final    Comment:          Prediabetes: 5.7 - 6.4           Diabetes: >6.4           Glycemic control for " adults with diabetes: <7.0      Microalbumin, Urine 05/22/2023 5.2  Not Estab. ug/mL Final    Hep C Virus Ab 05/22/2023 Non Reactive  Non Reactive Final    Comment: HCV antibody alone does not differentiate between previously  resolved infection and active infection. Equivocal and Reactive  HCV antibody results should be followed up with an HCV RNA test  to support the diagnosis of active HCV infection.      Total Cholesterol 05/22/2023 154  100 - 199 mg/dL Final    Triglycerides 05/22/2023 101  0 - 149 mg/dL Final    HDL Cholesterol 05/22/2023 37 (L)  >39 mg/dL Final    VLDL Cholesterol [place] 05/22/2023 19  5 - 40 mg/dL Final    LDL Chol Calc (NIH) 05/22/2023 98  0 - 99 mg/dL Final        Radiology Results        Assessment / Plan         Assessment and Plan   Diagnoses and all orders for this visit:    1. Primary hypertension (Primary)  Assessment & Plan:  Hypertension is improving with treatment.  Continue current treatment regimen.  Dietary sodium restriction.  Regular aerobic exercise.  Ambulatory blood pressure monitoring.  Blood pressure will be reassessed at the next regular appointment.    Orders:  -     atenolol (Tenormin) 25 MG tablet; Take one tablet by mouth daily. Check pulse before taking, if pulse less than 60 bpm do not take atenolol  Dispense: 30 tablet; Refill: 1  -     lisinopril (PRINIVIL,ZESTRIL) 20 MG tablet; Take 1 tablet by mouth Daily.  Dispense: 90 tablet; Refill: 1  -     triamterene-hydrochlorothiazide (DYAZIDE) 37.5-25 MG per capsule; Take 1 capsule by mouth Every Morning.  Dispense: 90 capsule; Refill: 1  -     verapamil SR (CALAN-SR) 120 MG CR tablet; Take 1 tablet by mouth Daily.  Dispense: 90 tablet; Refill: 1    2. Mixed hyperlipidemia  Assessment & Plan:  Lipid abnormalities are unchanged.  Nutritional counseling was provided. and Pharmacotherapy as ordered.  Lipids will be reassessed in 3 months.  FLP will be ordered at follow up     Orders:  -     pravastatin (PRAVACHOL) 40 MG  tablet; Take 1 tablet by mouth Daily.  Dispense: 90 tablet; Refill: 1    3. Controlled type 2 diabetes mellitus without complication, without long-term current use of insulin  Assessment & Plan:  Diabetes is improving with lifestyle modifications.   Continue current treatment regimen.  Reminded to bring in blood sugar diary at next visit.  Diabetes will be reassessed in 3 months.  Up to date on health maintenance, continue Metformin only     Orders:  -     metFORMIN (GLUCOPHAGE) 500 MG tablet; TAKE 1 & 1/2 (ONE & ONE-HALF) TABLETS BY MOUTH TWICE DAILY  Dispense: 270 tablet; Refill: 1    4. Situational anxiety  Assessment & Plan:  Well controlled with current dose of Prozac. Refill sent     Orders:  -     FLUoxetine (PROzac) 40 MG capsule; Take 1 capsule by mouth Daily.  Dispense: 90 capsule; Refill: 1    5. Fall (on) (from) other stairs and steps, sequela  -     meloxicam (Mobic) 15 MG tablet; Take 1 tablet by mouth Daily.  Dispense: 30 tablet; Refill: 0        Discussed possible differential diagnoses, testing, treatment, recommended non-pharmacological interventions, risks, warning signs to monitor for that would indicate need for follow-up in clinic or ER. If no improvement with these regimens or you have new or worsening symptoms follow-up. Patient verbalizes understanding and agreement with plan of care. Denies further needs or concerns.     Patient was given instructions and counseling regarding her condition and for health maintenance advised.            BMI is >= 25 and <30. (Overweight) The following options were offered after discussion;: weight loss educational material (shared in after visit summary)           Health Maintenance  Health Maintenance:   Health Maintenance Due   Topic Date Due    ZOSTER VACCINE (3 of 3) 03/21/2023    DXA SCAN  05/12/2023        Meds ordered during this visit  New Medications Ordered This Visit   Medications    atenolol (Tenormin) 25 MG tablet     Sig: Take one tablet by  mouth daily. Check pulse before taking, if pulse less than 60 bpm do not take atenolol     Dispense:  30 tablet     Refill:  1    lisinopril (PRINIVIL,ZESTRIL) 20 MG tablet     Sig: Take 1 tablet by mouth Daily.     Dispense:  90 tablet     Refill:  1    triamterene-hydrochlorothiazide (DYAZIDE) 37.5-25 MG per capsule     Sig: Take 1 capsule by mouth Every Morning.     Dispense:  90 capsule     Refill:  1    verapamil SR (CALAN-SR) 120 MG CR tablet     Sig: Take 1 tablet by mouth Daily.     Dispense:  90 tablet     Refill:  1    metFORMIN (GLUCOPHAGE) 500 MG tablet     Sig: TAKE 1 & 1/2 (ONE & ONE-HALF) TABLETS BY MOUTH TWICE DAILY     Dispense:  270 tablet     Refill:  1    pravastatin (PRAVACHOL) 40 MG tablet     Sig: Take 1 tablet by mouth Daily.     Dispense:  90 tablet     Refill:  1    FLUoxetine (PROzac) 40 MG capsule     Sig: Take 1 capsule by mouth Daily.     Dispense:  90 capsule     Refill:  1    meloxicam (Mobic) 15 MG tablet     Sig: Take 1 tablet by mouth Daily.     Dispense:  30 tablet     Refill:  0       Meds stopped during this visit:  Medications Discontinued During This Encounter   Medication Reason    atenolol (Tenormin) 25 MG tablet Reorder    pravastatin (PRAVACHOL) 40 MG tablet Reorder    lisinopril (PRINIVIL,ZESTRIL) 20 MG tablet Reorder    metFORMIN (GLUCOPHAGE) 500 MG tablet Reorder    FLUoxetine (PROzac) 40 MG capsule Reorder    triamterene-hydrochlorothiazide (DYAZIDE) 37.5-25 MG per capsule Reorder    verapamil SR (CALAN-SR) 120 MG CR tablet Reorder        Visit Diagnoses    ICD-10-CM ICD-9-CM   1. Primary hypertension  I10 401.9   2. Mixed hyperlipidemia  E78.2 272.2   3. Controlled type 2 diabetes mellitus without complication, without long-term current use of insulin  E11.9 250.00   4. Situational anxiety  F41.8 300.09   5. Fall (on) (from) other stairs and steps, sequela  W10.8XXS E880.9       Patient was given instructions and counseling regarding her condition or for health  maintenance advice. Please see specific information pulled into the AVS if appropriate.     Follow Up   Return in about 3 months (around 11/7/2023) for Recheck.          This document has been electronically signed by Stephanie Terry DO   August 7, 2023 16:52 EDT    Dictated Utilizing Dragon Dictation: Part of this note may be an electronic transcription/translation of spoken language to printed text using the Dragon Dictation System.    Stephanie Terry D.O.  [facility]

## 2023-11-07 ENCOUNTER — OFFICE VISIT (OUTPATIENT)
Dept: FAMILY MEDICINE CLINIC | Facility: CLINIC | Age: 78
End: 2023-11-07
Payer: MEDICARE

## 2023-11-07 ENCOUNTER — LAB (OUTPATIENT)
Dept: LAB | Facility: HOSPITAL | Age: 78
End: 2023-11-07
Payer: MEDICARE

## 2023-11-07 VITALS
HEART RATE: 66 BPM | HEIGHT: 63 IN | DIASTOLIC BLOOD PRESSURE: 70 MMHG | BODY MASS INDEX: 26.01 KG/M2 | TEMPERATURE: 98.6 F | OXYGEN SATURATION: 97 % | SYSTOLIC BLOOD PRESSURE: 120 MMHG | WEIGHT: 146.8 LBS

## 2023-11-07 DIAGNOSIS — M25.511 ACUTE PAIN OF RIGHT SHOULDER: ICD-10-CM

## 2023-11-07 DIAGNOSIS — D50.9 IRON DEFICIENCY ANEMIA, UNSPECIFIED IRON DEFICIENCY ANEMIA TYPE: ICD-10-CM

## 2023-11-07 DIAGNOSIS — E11.9 CONTROLLED TYPE 2 DIABETES MELLITUS WITHOUT COMPLICATION, WITHOUT LONG-TERM CURRENT USE OF INSULIN: ICD-10-CM

## 2023-11-07 DIAGNOSIS — E78.2 MIXED HYPERLIPIDEMIA: ICD-10-CM

## 2023-11-07 DIAGNOSIS — R05.2 SUBACUTE COUGH: ICD-10-CM

## 2023-11-07 DIAGNOSIS — M89.9 DISORDER OF BONE: ICD-10-CM

## 2023-11-07 DIAGNOSIS — I10 PRIMARY HYPERTENSION: Primary | ICD-10-CM

## 2023-11-07 DIAGNOSIS — I10 PRIMARY HYPERTENSION: ICD-10-CM

## 2023-11-07 LAB
25(OH)D3 SERPL-MCNC: 46.2 NG/ML (ref 30–100)
ALBUMIN SERPL-MCNC: 4.9 G/DL (ref 3.5–5.2)
ALBUMIN/GLOB SERPL: 1.9 G/DL
ALP SERPL-CCNC: 70 U/L (ref 39–117)
ALT SERPL W P-5'-P-CCNC: 19 U/L (ref 1–33)
ANION GAP SERPL CALCULATED.3IONS-SCNC: 11 MMOL/L (ref 5–15)
AST SERPL-CCNC: 25 U/L (ref 1–32)
BASOPHILS # BLD AUTO: 0.04 10*3/MM3 (ref 0–0.2)
BASOPHILS NFR BLD AUTO: 0.6 % (ref 0–1.5)
BILIRUB SERPL-MCNC: 0.3 MG/DL (ref 0–1.2)
BUN SERPL-MCNC: 6 MG/DL (ref 8–23)
BUN/CREAT SERPL: 8.6 (ref 7–25)
CALCIUM SPEC-SCNC: 9.9 MG/DL (ref 8.6–10.5)
CHLORIDE SERPL-SCNC: 102 MMOL/L (ref 98–107)
CHOLEST SERPL-MCNC: 152 MG/DL (ref 0–200)
CO2 SERPL-SCNC: 25 MMOL/L (ref 22–29)
CREAT SERPL-MCNC: 0.7 MG/DL (ref 0.57–1)
DEPRECATED RDW RBC AUTO: 45.4 FL (ref 37–54)
EGFRCR SERPLBLD CKD-EPI 2021: 89.2 ML/MIN/1.73
EOSINOPHIL # BLD AUTO: 0.17 10*3/MM3 (ref 0–0.4)
EOSINOPHIL NFR BLD AUTO: 2.5 % (ref 0.3–6.2)
ERYTHROCYTE [DISTWIDTH] IN BLOOD BY AUTOMATED COUNT: 14.9 % (ref 12.3–15.4)
FERRITIN SERPL-MCNC: 92.1 NG/ML (ref 13–150)
GLOBULIN UR ELPH-MCNC: 2.6 GM/DL
GLUCOSE SERPL-MCNC: 120 MG/DL (ref 65–99)
HBA1C MFR BLD: 6.4 % (ref 4.8–5.6)
HCT VFR BLD AUTO: 40.2 % (ref 34–46.6)
HDLC SERPL-MCNC: 38 MG/DL (ref 40–60)
HGB BLD-MCNC: 13.3 G/DL (ref 12–15.9)
IMM GRANULOCYTES # BLD AUTO: 0.02 10*3/MM3 (ref 0–0.05)
IMM GRANULOCYTES NFR BLD AUTO: 0.3 % (ref 0–0.5)
IRON 24H UR-MRATE: 77 MCG/DL (ref 37–145)
IRON SATN MFR SERPL: 19 % (ref 20–50)
LDLC SERPL CALC-MCNC: 87 MG/DL (ref 0–100)
LDLC/HDLC SERPL: 2.18 {RATIO}
LYMPHOCYTES # BLD AUTO: 3.36 10*3/MM3 (ref 0.7–3.1)
LYMPHOCYTES NFR BLD AUTO: 48.9 % (ref 19.6–45.3)
MCH RBC QN AUTO: 28 PG (ref 26.6–33)
MCHC RBC AUTO-ENTMCNC: 33.1 G/DL (ref 31.5–35.7)
MCV RBC AUTO: 84.6 FL (ref 79–97)
MONOCYTES # BLD AUTO: 0.47 10*3/MM3 (ref 0.1–0.9)
MONOCYTES NFR BLD AUTO: 6.8 % (ref 5–12)
NEUTROPHILS NFR BLD AUTO: 2.81 10*3/MM3 (ref 1.7–7)
NEUTROPHILS NFR BLD AUTO: 40.9 % (ref 42.7–76)
NRBC BLD AUTO-RTO: 0 /100 WBC (ref 0–0.2)
PLATELET # BLD AUTO: 380 10*3/MM3 (ref 140–450)
PMV BLD AUTO: 10.5 FL (ref 6–12)
POTASSIUM SERPL-SCNC: 3.9 MMOL/L (ref 3.5–5.2)
PROT SERPL-MCNC: 7.5 G/DL (ref 6–8.5)
RBC # BLD AUTO: 4.75 10*6/MM3 (ref 3.77–5.28)
SODIUM SERPL-SCNC: 138 MMOL/L (ref 136–145)
TIBC SERPL-MCNC: 413 MCG/DL (ref 298–536)
TRANSFERRIN SERPL-MCNC: 277 MG/DL (ref 200–360)
TRIGL SERPL-MCNC: 156 MG/DL (ref 0–150)
TSH SERPL DL<=0.05 MIU/L-ACNC: 1.81 UIU/ML (ref 0.27–4.2)
VIT B12 BLD-MCNC: 491 PG/ML (ref 211–946)
VLDLC SERPL-MCNC: 27 MG/DL (ref 5–40)
WBC NRBC COR # BLD: 6.87 10*3/MM3 (ref 3.4–10.8)

## 2023-11-07 PROCEDURE — 82728 ASSAY OF FERRITIN: CPT

## 2023-11-07 PROCEDURE — 83540 ASSAY OF IRON: CPT

## 2023-11-07 PROCEDURE — 82607 VITAMIN B-12: CPT

## 2023-11-07 PROCEDURE — 82306 VITAMIN D 25 HYDROXY: CPT

## 2023-11-07 PROCEDURE — 84466 ASSAY OF TRANSFERRIN: CPT

## 2023-11-07 PROCEDURE — 85025 COMPLETE CBC W/AUTO DIFF WBC: CPT

## 2023-11-07 PROCEDURE — 83036 HEMOGLOBIN GLYCOSYLATED A1C: CPT

## 2023-11-07 PROCEDURE — 80061 LIPID PANEL: CPT

## 2023-11-07 PROCEDURE — 80053 COMPREHEN METABOLIC PANEL: CPT

## 2023-11-07 PROCEDURE — 84443 ASSAY THYROID STIM HORMONE: CPT

## 2023-11-07 RX ORDER — METHYLPREDNISOLONE 4 MG/1
TABLET ORAL
Qty: 21 TABLET | Refills: 0 | Status: SHIPPED | OUTPATIENT
Start: 2023-11-07

## 2023-11-07 RX ORDER — AZITHROMYCIN 250 MG/1
TABLET, FILM COATED ORAL
Qty: 6 TABLET | Refills: 0 | Status: SHIPPED | OUTPATIENT
Start: 2023-11-07

## 2023-11-07 NOTE — ASSESSMENT & PLAN NOTE
Right shoulder pain after fall about 4 months ago.  Some crepitus on exam.  Ordering xray in office today and also referring patient to physical therapy.

## 2023-11-07 NOTE — ASSESSMENT & PLAN NOTE
Lipid abnormalities are unchanged.  Nutritional counseling was provided. and Pharmacotherapy as ordered.  Lipids will be reassessed in 3 months.  FLP will be ordered

## 2023-11-07 NOTE — ASSESSMENT & PLAN NOTE
Slight wheeze noted on exam today   Likely reactive airway   Rx Medrol dose candida and Zithromax

## 2023-11-07 NOTE — PROGRESS NOTES
Subjective     Chief Complaint  primary hypertension  (F/u) and Cough (X2 months )    Subjective          Norma Fletcher is a 78 y.o. female who presents today to [facility] for initial evaluation .    HPI:   Cough  This is a new problem. The current episode started more than 1 month ago. The problem has been waxing and waning. The problem occurs every few hours. The cough is Productive of sputum. Associated symptoms include headaches and heartburn. Pertinent negatives include no chills, fever, nasal congestion or shortness of breath. The symptoms are aggravated by lying down. She has tried cool air and rest for the symptoms.         Ms. Fletcher is a very pleasant 77-year-old female who presents today to follow up on chronic issues.     She has a past medical history of hypertension, hyperlipidemia, type 2 diabetes, situational anxiety.      For her hypertension she is prescribed atenolol 25 mg daily, lisinopril 20 mg daily, triamterene/hydrochlorothiazide 37.5-25 mg daily and verapamil 120 mg daily.  Her blood pressure is very well controlled in office today at 120/70    For her diabetes her current prescription is metformin 500 mg daily, her hemoglobin A1c was 6.5% in May    About 4 weeks ago,. She had a fall on the stairs where she was walking in flip-flops and turned to go back down the stairs and lost her footing falling hard on her arm.  She was seen in urgent care and x-rays were negative for fractures.  She was diagnosed with a hematoma of the arm and had some swelling in the thumb.  - Today, she reports continues shoulder pain after this fall. When she ha xrays in the  they didn't xray the shoulder, only the hand..     The following portions of the patient's history were reviewed and updated as appropriate: allergies, current medications, past family history, past medical history, past social history, past surgical history and problem list.    Objective     Objective      Allergy:   No Known Allergies     Current Medications:   Current Outpatient Medications   Medication Sig Dispense Refill    aspirin 81 MG EC tablet Asprin Ec Low Dose 81 mg tablet,delayed release   Take 1 tablet every day by oral route.      atenolol (Tenormin) 25 MG tablet Take one tablet by mouth daily. Check pulse before taking, if pulse less than 60 bpm do not take atenolol 30 tablet 1    calcium carbonate (OS-KEYANNA) 600 MG tablet Take 1 tablet by mouth Daily.      dorzolamide-timolol (COSOPT) 22.3-6.8 MG/ML ophthalmic solution instill 1 drop into each eye twice daily      esomeprazole (nexIUM) 20 MG capsule Take 1 capsule by mouth Every Morning Before Breakfast.      ferrous sulfate 324 (65 Fe) MG tablet delayed-release EC tablet Take 1 tablet by mouth Daily With Breakfast.      FLUoxetine (PROzac) 40 MG capsule Take 1 capsule by mouth Daily. 90 capsule 1    Latanoprost 0.005 % emulsion       lisinopril (PRINIVIL,ZESTRIL) 20 MG tablet Take 1 tablet by mouth Daily. 90 tablet 1    Magnesium Oxide -Mg Supplement 400 (240 Mg) MG tablet Take 1 tablet by mouth Daily.      meloxicam (Mobic) 15 MG tablet Take 1 tablet by mouth Daily. 30 tablet 0    metFORMIN (GLUCOPHAGE) 500 MG tablet TAKE 1 & 1/2 (ONE & ONE-HALF) TABLETS BY MOUTH TWICE DAILY 270 tablet 1    Multiple Vitamins-Minerals (b complex-C-E-zinc) tablet Take 1 tablet by mouth Daily.      pravastatin (PRAVACHOL) 40 MG tablet Take 1 tablet by mouth Daily. 90 tablet 1    triamterene-hydrochlorothiazide (DYAZIDE) 37.5-25 MG per capsule Take 1 capsule by mouth Every Morning. 90 capsule 1    Trimo-Hanley 0.025-0.01 % gel INSERT 1 GRAM APPLICATOR IN VAGINA TWICE A WEEK AS NEEDED.      verapamil SR (CALAN-SR) 120 MG CR tablet Take 1 tablet by mouth Daily. 90 tablet 1    vitamin D3 125 MCG (5000 UT) capsule capsule Take 1 capsule by mouth Daily.      azithromycin (Zithromax Z-Mesfin) 250 MG tablet Take 2 tablets by mouth on day 1, then 1 tablet daily on days 2-5 6 tablet 0  "   methylPREDNISolone (MEDROL) 4 MG dose pack Take as directed on package instructions. 21 tablet 0     No current facility-administered medications for this visit.       Past Medical History:  Past Medical History:   Diagnosis Date    Dyslipidemia     Dysphagia     Recurrent dysphagia secondary to Schatzki's ring; EGD 7/30/2014, 12/9/2015, Dr. Nicholson    Essential (primary) hypertension     Fibrocystic breast disease     status post stereotactic biopsy 9/15/09    Hepatitis     A, B, and C, status post GI consult with no evidence of active disease    Hypertension     Iron deficiency     Mixed hyperlipidemia     Rash and other nonspecific skin eruption     Synovial cyst of popliteal space (Baker), left knee     Type 2 diabetes mellitus without complications     Unilateral primary osteoarthritis, left knee        Past Surgical History:  Past Surgical History:   Procedure Laterality Date    CATARACT EXTRACTION, BILATERAL  02/2022    COLONOSCOPY      multiple all negative, most recently 7/15/2020 completely normal, Dr. Nicholson, recommendation for five-year followup in the year 2025       Social History:  Social History     Socioeconomic History    Marital status:    Tobacco Use    Smoking status: Never     Passive exposure: Never    Smokeless tobacco: Never   Vaping Use    Vaping Use: Never used   Substance and Sexual Activity    Alcohol use: Never    Drug use: Never    Sexual activity: Defer       Family History:  Family History   Problem Relation Age of Onset    Breast cancer Other     Colon cancer Other          Vital Signs:   /70   Pulse 66   Temp 98.6 °F (37 °C) (Infrared)   Ht 160 cm (63\")   Wt 66.6 kg (146 lb 12.8 oz)   SpO2 97%   BMI 26.00 kg/m²      Physical Exam:  Physical Exam  Constitutional:       Appearance: She is normal weight.   HENT:      Right Ear: Tympanic membrane normal.   Cardiovascular:      Rate and Rhythm: Normal rate and regular rhythm.      Pulses: Normal pulses.      " Heart sounds: No murmur heard.  Pulmonary:      Effort: Pulmonary effort is normal. No respiratory distress.      Breath sounds: No wheezing.   Abdominal:      General: Abdomen is flat. Bowel sounds are normal.   Musculoskeletal:         General: No swelling or tenderness.      Right shoulder: Tenderness and crepitus present. Decreased range of motion.      Cervical back: Normal range of motion.   Neurological:      General: No focal deficit present.      Mental Status: She is alert. Mental status is at baseline.   Psychiatric:         Mood and Affect: Mood normal.         Behavior: Behavior normal.         Thought Content: Thought content normal.         Judgment: Judgment normal.               PHQ-9 Score  PHQ-9 Total Score:       Lab Review  No visits with results within 3 Month(s) from this visit.   Latest known visit with results is:   Office Visit on 05/22/2023   Component Date Value Ref Range Status    Hemoglobin A1C 05/22/2023 6.5 (H)  4.8 - 5.6 % Final    Comment:          Prediabetes: 5.7 - 6.4           Diabetes: >6.4           Glycemic control for adults with diabetes: <7.0      Microalbumin, Urine 05/22/2023 5.2  Not Estab. ug/mL Final    Hep C Virus Ab 05/22/2023 Non Reactive  Non Reactive Final    Comment: HCV antibody alone does not differentiate between previously  resolved infection and active infection. Equivocal and Reactive  HCV antibody results should be followed up with an HCV RNA test  to support the diagnosis of active HCV infection.      Total Cholesterol 05/22/2023 154  100 - 199 mg/dL Final    Triglycerides 05/22/2023 101  0 - 149 mg/dL Final    HDL Cholesterol 05/22/2023 37 (L)  >39 mg/dL Final    VLDL Cholesterol Prosper 05/22/2023 19  5 - 40 mg/dL Final    LDL Chol Calc (NIH) 05/22/2023 98  0 - 99 mg/dL Final        Radiology Results        Assessment / Plan         Assessment and Plan   Diagnoses and all orders for this visit:    1. Primary hypertension (Primary)  Assessment &  Plan:  Hypertension is improving with treatment.  Continue current treatment regimen.  Dietary sodium restriction.  Regular aerobic exercise.  Ambulatory blood pressure monitoring.  Blood pressure will be reassessed at the next regular appointment.    Orders:  -     CBC & Differential; Future  -     Comprehensive Metabolic Panel; Future    2. Controlled type 2 diabetes mellitus without complication, without long-term current use of insulin  Assessment & Plan:  Diabetes is improving with lifestyle modifications.   Continue current treatment regimen.  Reminded to bring in blood sugar diary at next visit.  Diabetes will be reassessed in 3 months.  Up to date on health maintenance, continue Metformin only     Orders:  -     Hemoglobin A1c; Future  -     TSH Rfx On Abnormal To Free T4; Future    3. Mixed hyperlipidemia  Assessment & Plan:  Lipid abnormalities are unchanged.  Nutritional counseling was provided. and Pharmacotherapy as ordered.  Lipids will be reassessed in 3 months.  FLP will be ordered    Orders:  -     Lipid Panel; Future    4. Subacute cough  Assessment & Plan:  Slight wheeze noted on exam today   Likely reactive airway   Rx Medrol dose mesfin and Zithromax     Orders:  -     XR Chest PA & Lateral (In Office)  -     methylPREDNISolone (MEDROL) 4 MG dose pack; Take as directed on package instructions.  Dispense: 21 tablet; Refill: 0  -     azithromycin (Zithromax Z-Mesfin) 250 MG tablet; Take 2 tablets by mouth on day 1, then 1 tablet daily on days 2-5  Dispense: 6 tablet; Refill: 0    5. Acute pain of right shoulder  Assessment & Plan:  Right shoulder pain after fall about 4 months ago.  Some crepitus on exam.  Ordering xray in office today and also referring patient to physical therapy.     Orders:  -     XR Shoulder 2+ View Right (In Office)  -     Ambulatory Referral to Physical Therapy Evaluate and treat    6. Disorder of bone  -     Vitamin D,25-Hydroxy; Future  -     Vitamin B12; Future    7. Iron  deficiency anemia, unspecified iron deficiency anemia type  -     Iron Profile; Future  -     Ferritin; Future          Discussed possible differential diagnoses, testing, treatment, recommended non-pharmacological interventions, risks, warning signs to monitor for that would indicate need for follow-up in clinic or ER. If no improvement with these regimens or you have new or worsening symptoms follow-up. Patient verbalizes understanding and agreement with plan of care. Denies further needs or concerns.     Patient was given instructions and counseling regarding her condition and for health maintenance advised.            BMI is >= 25 and <30. (Overweight) The following options were offered after discussion;: weight loss educational material (shared in after visit summary)           Health Maintenance  Health Maintenance:   Health Maintenance Due   Topic Date Due    DXA SCAN  05/12/2023        Meds ordered during this visit  New Medications Ordered This Visit   Medications    methylPREDNISolone (MEDROL) 4 MG dose pack     Sig: Take as directed on package instructions.     Dispense:  21 tablet     Refill:  0    azithromycin (Zithromax Z-Mesfin) 250 MG tablet     Sig: Take 2 tablets by mouth on day 1, then 1 tablet daily on days 2-5     Dispense:  6 tablet     Refill:  0       Meds stopped during this visit:  There are no discontinued medications.       Visit Diagnoses    ICD-10-CM ICD-9-CM   1. Primary hypertension  I10 401.9   2. Controlled type 2 diabetes mellitus without complication, without long-term current use of insulin  E11.9 250.00   3. Mixed hyperlipidemia  E78.2 272.2   4. Subacute cough  R05.2 786.2   5. Acute pain of right shoulder  M25.511 719.41   6. Disorder of bone  M89.9 733.90   7. Iron deficiency anemia, unspecified iron deficiency anemia type  D50.9 280.9         Patient was given instructions and counseling regarding her condition or for health maintenance advice. Please see specific information  pulled into the AVS if appropriate.     Follow Up   Return in about 3 months (around 2/7/2024) for Recheck.      This document has been electronically signed by Stephanie Terry DO   November 7, 2023 09:27 EST    Dictated Utilizing Dragon Dictation: Part of this note may be an electronic transcription/translation of spoken language to printed text using the Dragon Dictation System.    Stephanie Terry D.O.  [facility] [place]

## 2023-11-30 DIAGNOSIS — E78.2 MIXED HYPERLIPIDEMIA: ICD-10-CM
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
## 2023-11-30 DIAGNOSIS — I10 PRIMARY HYPERTENSION: ICD-10-CM

## 2023-11-30 DIAGNOSIS — F41.8 SITUATIONAL ANXIETY: ICD-10-CM

## 2023-11-30 DIAGNOSIS — E11.9 CONTROLLED TYPE 2 DIABETES MELLITUS WITHOUT COMPLICATION, WITHOUT LONG-TERM CURRENT USE OF INSULIN: ICD-10-CM

## 2023-11-30 RX ORDER — PRAVASTATIN SODIUM 40 MG
40 TABLET ORAL DAILY
Qty: 90 TABLET | Refills: 1 | Status: SHIPPED | OUTPATIENT
Start: 2023-11-30

## 2023-11-30 RX ORDER — TRIAMTERENE AND HYDROCHLOROTHIAZIDE 37.5; 25 MG/1; MG/1
1 CAPSULE ORAL EVERY MORNING
Qty: 90 CAPSULE | Refills: 1 | Status: SHIPPED | OUTPATIENT
Start: 2023-11-30

## 2023-11-30 RX ORDER — FLUOXETINE HYDROCHLORIDE 40 MG/1
40 CAPSULE ORAL DAILY
Qty: 90 CAPSULE | Refills: 1 | Status: SHIPPED | OUTPATIENT
Start: 2023-11-30

## 2023-11-30 RX ORDER — LISINOPRIL 20 MG/1
20 TABLET ORAL DAILY
Qty: 90 TABLET | Refills: 1 | Status: SHIPPED | OUTPATIENT
Start: 2023-11-30

## 2023-11-30 RX ORDER — MELOXICAM 15 MG/1
15 TABLET ORAL DAILY
Qty: 30 TABLET | Refills: 0 | Status: SHIPPED | OUTPATIENT
Start: 2023-11-30

## 2023-12-04 DIAGNOSIS — I10 PRIMARY HYPERTENSION: ICD-10-CM

## 2023-12-08 DIAGNOSIS — I10 PRIMARY HYPERTENSION: ICD-10-CM

## 2023-12-08 RX ORDER — ATENOLOL 25 MG/1
TABLET ORAL
Qty: 30 TABLET | Refills: 1 | Status: SHIPPED | OUTPATIENT
Start: 2023-12-08

## 2023-12-08 NOTE — TELEPHONE ENCOUNTER
Rx Refill Note  Requested Prescriptions     Pending Prescriptions Disp Refills    atenolol (Tenormin) 25 MG tablet 30 tablet 1     Sig: Take one tablet by mouth daily. Check pulse before taking, if pulse less than 60 bpm do not take atenolol      Last office visit with prescribing clinician: 11/7/2023   Last telemedicine visit with prescribing clinician: Visit date not found   Next office visit with prescribing clinician: 2/5/2024                         Would you like a call back once the refill request has been completed: [] Yes [] No    If the office needs to give you a call back, can they leave a voicemail: [] Yes [] No    Rissa Bennett MA  12/08/23, 10:00 EST

## 2024-02-05 ENCOUNTER — OFFICE VISIT (OUTPATIENT)
Dept: FAMILY MEDICINE CLINIC | Facility: CLINIC | Age: 79
End: 2024-02-05
Payer: MEDICARE

## 2024-02-05 VITALS
WEIGHT: 150 LBS | OXYGEN SATURATION: 97 % | BODY MASS INDEX: 26.58 KG/M2 | TEMPERATURE: 97.1 F | DIASTOLIC BLOOD PRESSURE: 66 MMHG | HEIGHT: 63 IN | SYSTOLIC BLOOD PRESSURE: 122 MMHG | HEART RATE: 59 BPM

## 2024-02-05 DIAGNOSIS — I10 PRIMARY HYPERTENSION: ICD-10-CM

## 2024-02-05 DIAGNOSIS — R09.82 PND (POST-NASAL DRIP): Primary | ICD-10-CM

## 2024-02-05 DIAGNOSIS — R05.3 CHRONIC COUGH: ICD-10-CM

## 2024-02-05 PROCEDURE — 3074F SYST BP LT 130 MM HG: CPT | Performed by: FAMILY MEDICINE

## 2024-02-05 PROCEDURE — 1160F RVW MEDS BY RX/DR IN RCRD: CPT | Performed by: FAMILY MEDICINE

## 2024-02-05 PROCEDURE — 3078F DIAST BP <80 MM HG: CPT | Performed by: FAMILY MEDICINE

## 2024-02-05 PROCEDURE — 99214 OFFICE O/P EST MOD 30 MIN: CPT | Performed by: FAMILY MEDICINE

## 2024-02-05 PROCEDURE — 1159F MED LIST DOCD IN RCRD: CPT | Performed by: FAMILY MEDICINE

## 2024-02-05 RX ORDER — LORATADINE 10 MG/1
5 TABLET ORAL DAILY
Qty: 15 TABLET | Refills: 1 | Status: SHIPPED | OUTPATIENT
Start: 2024-02-05

## 2024-02-05 NOTE — PROGRESS NOTES
Follow Up Office Visit      Date: 2024   Patient Name: Norma Fletcher  : 1945   MRN: [id number]     Chief Complaint:    Chief Complaint   Patient presents with    Cough     X2 month     primary hypertension      F/u       History of Present Illness: Norma Fletcher is a 78 y.o. female who presents today for evaluation of cough and for follow for htn.      Sees Dr. Terry for PCP.  Schedule states patient will need to schedule follow-up with Dr. Terry at checkout.      Onset of cough was 2 months ago.  Sometimes worse at times than others.  Sometimes productive and sometimes dry cough.      Takes Lisinopril, Verapamil, atenolol and Dyazide for htn.      Was seen in November by Dr. Terry for productive cough.  Reports previous aibiotic helped, but has now started back.        Subjective      Review of Systems:   Review of Systems   Constitutional:  Negative for chills and fever.   Respiratory:  Positive for cough.    Gastrointestinal:  Negative for diarrhea, nausea and vomiting.   Neurological:  Negative for seizures and syncope.       I have reviewed the patients family history, social history, past medical history, past surgical history and have updated it as appropriate.     Medications:     Current Outpatient Medications:     aspirin 81 MG EC tablet, Asprin Ec Low Dose 81 mg tablet,delayed release  Take 1 tablet every day by oral route., Disp: , Rfl:     atenolol (Tenormin) 25 MG tablet, Take one tablet by mouth daily. Check pulse before taking, if pulse less than 60 bpm do not take atenolol, Disp: 30 tablet, Rfl: 1    azithromycin (Zithromax Z-Mesfin) 250 MG tablet, Take 2 tablets by mouth on day 1, then 1 tablet daily on days 2-5, Disp: 6 tablet, Rfl: 0    calcium carbonate (OS-KEYANNA) 600 MG tablet, Take 1 tablet by mouth Daily., Disp: , Rfl:     dorzolamide-timolol (COSOPT) 22.3-6.8 MG/ML ophthalmic solution, instill 1 drop into each eye twice daily, Disp: , Rfl:     esomeprazole (nexIUM) 20 MG  capsule, Take 1 capsule by mouth Every Morning Before Breakfast., Disp: , Rfl:     ferrous sulfate 324 (65 Fe) MG tablet delayed-release EC tablet, Take 1 tablet by mouth Daily With Breakfast., Disp: , Rfl:     FLUoxetine (PROzac) 40 MG capsule, Take 1 capsule by mouth Daily., Disp: 90 capsule, Rfl: 1    Latanoprost 0.005 % emulsion, , Disp: , Rfl:     lisinopril (PRINIVIL,ZESTRIL) 20 MG tablet, Take 1 tablet by mouth Daily., Disp: 90 tablet, Rfl: 1    Magnesium Oxide -Mg Supplement 400 (240 Mg) MG tablet, Take 1 tablet by mouth Daily., Disp: , Rfl:     meloxicam (Mobic) 15 MG tablet, Take 1 tablet by mouth Daily., Disp: 30 tablet, Rfl: 0    metFORMIN (GLUCOPHAGE) 500 MG tablet, TAKE 1 & 1/2 (ONE & ONE-HALF) TABLETS BY MOUTH TWICE DAILY, Disp: 270 tablet, Rfl: 1    Multiple Vitamins-Minerals (b complex-C-E-zinc) tablet, Take 1 tablet by mouth Daily., Disp: , Rfl:     pravastatin (PRAVACHOL) 40 MG tablet, Take 1 tablet by mouth Daily., Disp: 90 tablet, Rfl: 1    triamterene-hydrochlorothiazide (DYAZIDE) 37.5-25 MG per capsule, Take 1 capsule by mouth Every Morning., Disp: 90 capsule, Rfl: 1    Trimo-Hanley 0.025-0.01 % gel, INSERT 1 GRAM APPLICATOR IN VAGINA TWICE A WEEK AS NEEDED., Disp: , Rfl:     verapamil SR (CALAN-SR) 120 MG CR tablet, Take 1 tablet by mouth Daily., Disp: 90 tablet, Rfl: 1    vitamin D3 125 MCG (5000 UT) capsule capsule, Take 1 capsule by mouth Daily., Disp: , Rfl:     loratadine (Claritin) 10 MG tablet, Take 0.5 tablets by mouth Daily., Disp: 15 tablet, Rfl: 1    methylPREDNISolone (MEDROL) 4 MG dose pack, Take as directed on package instructions. (Patient not taking: Reported on 2/5/2024), Disp: 21 tablet, Rfl: 0    Allergies:   No Known Allergies    Objective     Physical Exam: Please see above  Vital Signs:   Vitals:    02/05/24 0834   BP: 122/66   BP Location: Right arm   Patient Position: Sitting   Cuff Size: Adult   Pulse: 59   Temp: 97.1 °F (36.2 °C)   TempSrc: Infrared   SpO2: 97%  "  Weight: 68 kg (150 lb)   Height: 160 cm (63\")   PainSc: 0-No pain     Body mass index is 26.57 kg/m².          Physical Exam  Vitals and nursing note reviewed.   Constitutional:       Appearance: Normal appearance.   HENT:      Head: Normocephalic and atraumatic.      Mouth/Throat:      Comments: Drainage noted posterior pharynx.  Neck:      Vascular: No carotid bruit.   Cardiovascular:      Rate and Rhythm: Normal rate and regular rhythm.      Heart sounds: Normal heart sounds. No murmur heard.  Pulmonary:      Effort: Pulmonary effort is normal.      Breath sounds: Normal breath sounds. No wheezing, rhonchi or rales.   Abdominal:      General: Bowel sounds are normal.      Palpations: Abdomen is soft. There is no mass.      Tenderness: There is no abdominal tenderness.   Musculoskeletal:      Right lower leg: No edema.      Left lower leg: No edema.   Skin:     Coloration: Skin is not jaundiced or pale.      Findings: No erythema.   Neurological:      Mental Status: She is alert. Mental status is at baseline.   Psychiatric:         Mood and Affect: Mood normal.         Behavior: Behavior normal.         Procedures    Results:   Labs:   Hemoglobin A1C   Date Value Ref Range Status   11/07/2023 6.40 (H) 4.80 - 5.60 % Final     TSH   Date Value Ref Range Status   11/07/2023 1.810 0.270 - 4.200 uIU/mL Final        POCT Results (if applicable):   Results for orders placed or performed in visit on 11/07/23   Comprehensive Metabolic Panel    Specimen: Blood   Result Value Ref Range    Glucose 120 (H) 65 - 99 mg/dL    BUN 6 (L) 8 - 23 mg/dL    Creatinine 0.70 0.57 - 1.00 mg/dL    Sodium 138 136 - 145 mmol/L    Potassium 3.9 3.5 - 5.2 mmol/L    Chloride 102 98 - 107 mmol/L    CO2 25.0 22.0 - 29.0 mmol/L    Calcium 9.9 8.6 - 10.5 mg/dL    Total Protein 7.5 6.0 - 8.5 g/dL    Albumin 4.9 3.5 - 5.2 g/dL    ALT (SGPT) 19 1 - 33 U/L    AST (SGOT) 25 1 - 32 U/L    Alkaline Phosphatase 70 39 - 117 U/L    Total Bilirubin 0.3 0.0 " - 1.2 mg/dL    Globulin 2.6 gm/dL    A/G Ratio 1.9 g/dL    BUN/Creatinine Ratio 8.6 7.0 - 25.0    Anion Gap 11.0 5.0 - 15.0 mmol/L    eGFR 89.2 >60.0 mL/min/1.73   Hemoglobin A1c    Specimen: Blood   Result Value Ref Range    Hemoglobin A1C 6.40 (H) 4.80 - 5.60 %   Lipid Panel    Specimen: Blood   Result Value Ref Range    Total Cholesterol 152 0 - 200 mg/dL    Triglycerides 156 (H) 0 - 150 mg/dL    HDL Cholesterol 38 (L) 40 - 60 mg/dL    LDL Cholesterol  87 0 - 100 mg/dL    VLDL Cholesterol 27 5 - 40 mg/dL    LDL/HDL Ratio 2.18    TSH Rfx On Abnormal To Free T4    Specimen: Blood   Result Value Ref Range    TSH 1.810 0.270 - 4.200 uIU/mL   Vitamin D,25-Hydroxy    Specimen: Blood   Result Value Ref Range    25 Hydroxy, Vitamin D 46.2 30.0 - 100.0 ng/ml   Vitamin B12    Specimen: Blood   Result Value Ref Range    Vitamin B-12 491 211 - 946 pg/mL   Iron Profile    Specimen: Blood   Result Value Ref Range    Iron 77 37 - 145 mcg/dL    Iron Saturation (TSAT) 19 (L) 20 - 50 %    Transferrin 277 200 - 360 mg/dL    TIBC 413 298 - 536 mcg/dL   Ferritin    Specimen: Blood   Result Value Ref Range    Ferritin 92.10 13.00 - 150.00 ng/mL   CBC Auto Differential    Specimen: Blood   Result Value Ref Range    WBC 6.87 3.40 - 10.80 10*3/mm3    RBC 4.75 3.77 - 5.28 10*6/mm3    Hemoglobin 13.3 12.0 - 15.9 g/dL    Hematocrit 40.2 34.0 - 46.6 %    MCV 84.6 79.0 - 97.0 fL    MCH 28.0 26.6 - 33.0 pg    MCHC 33.1 31.5 - 35.7 g/dL    RDW 14.9 12.3 - 15.4 %    RDW-SD 45.4 37.0 - 54.0 fl    MPV 10.5 6.0 - 12.0 fL    Platelets 380 140 - 450 10*3/mm3    Neutrophil % 40.9 (L) 42.7 - 76.0 %    Lymphocyte % 48.9 (H) 19.6 - 45.3 %    Monocyte % 6.8 5.0 - 12.0 %    Eosinophil % 2.5 0.3 - 6.2 %    Basophil % 0.6 0.0 - 1.5 %    Immature Grans % 0.3 0.0 - 0.5 %    Neutrophils, Absolute 2.81 1.70 - 7.00 10*3/mm3    Lymphocytes, Absolute 3.36 (H) 0.70 - 3.10 10*3/mm3    Monocytes, Absolute 0.47 0.10 - 0.90 10*3/mm3    Eosinophils, Absolute 0.17 0.00 -  0.40 10*3/mm3    Basophils, Absolute 0.04 0.00 - 0.20 10*3/mm3    Immature Grans, Absolute 0.02 0.00 - 0.05 10*3/mm3    nRBC 0.0 0.0 - 0.2 /100 WBC       Imaging:   No valid procedures specified.       Assessment / Plan      Assessment/Plan: Had side effects with full dose Claritin D.      1. PND (post-nasal drip)  Patient with drainage posterior pharynx.  I feel cough related to this drainage.  Will start Claritin 5 mg daily.  Patient to take half tablet of Claritin daily.  Had previously taken Claritin-D and reported it dried her out too much.    - loratadine (Claritin) 10 MG tablet; Take 0.5 tablets by mouth Daily.  Dispense: 15 tablet; Refill: 1    2. Chronic cough  As above.  If no improvement in 2 weeks, patient to follow-up with PCP.  I do not feel her cough is related to her lisinopril use, as her symptoms increase, and decrease.      - loratadine (Claritin) 10 MG tablet; Take 0.5 tablets by mouth Daily.  Dispense: 15 tablet; Refill: 1    3. Primary hypertension  Discussed importance of following low-salt diet for blood pressure control.  BP controlled on current regimen.  Continue current regimen.        Part of this note may be an electronic transcription/translation of spoken language to printed text using the Dragon Dictation System.      Vaccine Counseling:      Follow Up:   Return if symptoms worsen or fail to improve with pcp, or with pcp as scheduled..      DO CHRISTA Mahmood

## 2024-04-09 ENCOUNTER — PRE-ADMISSION TESTING (OUTPATIENT)
Dept: PREADMISSION TESTING | Facility: HOSPITAL | Age: 79
End: 2024-04-09
Payer: MEDICARE

## 2024-04-09 VITALS — BODY MASS INDEX: 27.16 KG/M2 | WEIGHT: 147.6 LBS | HEIGHT: 62 IN

## 2024-04-09 LAB
ANION GAP SERPL CALCULATED.3IONS-SCNC: 10 MMOL/L (ref 5–15)
BUN SERPL-MCNC: 7 MG/DL (ref 8–23)
BUN/CREAT SERPL: 13.2 (ref 7–25)
CALCIUM SPEC-SCNC: 8.8 MG/DL (ref 8.6–10.5)
CHLORIDE SERPL-SCNC: 102 MMOL/L (ref 98–107)
CO2 SERPL-SCNC: 28 MMOL/L (ref 22–29)
CREAT SERPL-MCNC: 0.53 MG/DL (ref 0.57–1)
DEPRECATED RDW RBC AUTO: 43.1 FL (ref 37–54)
EGFRCR SERPLBLD CKD-EPI 2021: 95.4 ML/MIN/1.73
ERYTHROCYTE [DISTWIDTH] IN BLOOD BY AUTOMATED COUNT: 14.2 % (ref 12.3–15.4)
GLUCOSE SERPL-MCNC: 114 MG/DL (ref 65–99)
HCT VFR BLD AUTO: 38.8 % (ref 34–46.6)
HGB BLD-MCNC: 12.8 G/DL (ref 12–15.9)
MCH RBC QN AUTO: 27.4 PG (ref 26.6–33)
MCHC RBC AUTO-ENTMCNC: 33 G/DL (ref 31.5–35.7)
MCV RBC AUTO: 83.1 FL (ref 79–97)
PLATELET # BLD AUTO: 363 10*3/MM3 (ref 140–450)
PMV BLD AUTO: 9.9 FL (ref 6–12)
POTASSIUM SERPL-SCNC: 3.9 MMOL/L (ref 3.5–5.2)
RBC # BLD AUTO: 4.67 10*6/MM3 (ref 3.77–5.28)
SODIUM SERPL-SCNC: 140 MMOL/L (ref 136–145)
WBC NRBC COR # BLD AUTO: 5.5 10*3/MM3 (ref 3.4–10.8)

## 2024-04-09 PROCEDURE — 93005 ELECTROCARDIOGRAM TRACING: CPT

## 2024-04-09 PROCEDURE — 85027 COMPLETE CBC AUTOMATED: CPT | Performed by: UROLOGY

## 2024-04-09 PROCEDURE — 80048 BASIC METABOLIC PNL TOTAL CA: CPT | Performed by: UROLOGY

## 2024-04-09 RX ORDER — MULTIVIT WITH MINERALS/LUTEIN
500 TABLET ORAL 3 TIMES WEEKLY
COMMUNITY

## 2024-04-09 RX ORDER — VITAMIN E 268 MG
400 CAPSULE ORAL DAILY
COMMUNITY

## 2024-04-09 RX ORDER — GINSENG 100 MG
50 CAPSULE ORAL DAILY
COMMUNITY

## 2024-04-09 NOTE — DISCHARGE INSTRUCTIONS
Per Anesthesia Request, patient instructed not to take their ACE/ARB medications on the AM of surgery.     Patient instructed to drink 20 ounces of Gatorade or Gatorlyte (if diabetic) and it needs to be completed 1 hour (for Main OR patients) or 2 hours (scheduled  section & [facility] patients) before given arrival time for procedure (NO RED Gatorade and NO Gatorade Zero).    Patient verbalized understanding.     Patient to apply Chlorhexadine wipes  to surgical area (as instructed) the night before procedure and the AM of procedure. Wipes provided.     Patient viewed general PAT education video as instructed in their preoperative information received from their surgeon.  Patient stated the general PAT education video was viewed in its entirety and survey completed.  Copies of PAT general education handouts (Incentive Spirometry, Meds to Beds Program, Patient Belongings, Pre-op skin preparation instructions, Blood Glucose testing, Visitor policy, Surgery FAQ, Code H) distributed to patient if not printed. Education related to the PAT pass and skin preparation for surgery (if applicable) completed in PAT as a reinforcement to PAT education video. Patient instructed to return PAT pass provided today as well as completed skin preparation sheet (if applicable) on the day of procedure.     Additionally if patient had not viewed video yet but intended to view it at home or in our waiting area, then referred them to the handout with QR code/link provided during PAT visit.  Instructed patient to complete survey after viewing the video in its entirety.  Encouraged patient/family to read PAT general education handouts thoroughly and notify PAT staff with any questions or concerns. Patient verbalized understanding of all information and priority content.

## 2024-04-09 NOTE — PAT
Telephone orders received and verified with readback from Dr No for anesthesia guidelines, an labs preop as well as ERAS protocol .    An arrival time for procedure was not provided during PAT visit. If patient had any questions or concerns about their arrival time, they were instructed to contact their surgeon/physician.  Additionally, if the patient referred to an arrival time that was acquired from their my chart account, patient was encouraged to verify that time with their surgeon/physician. Arrival times are NOT provided in Pre Admission Testing Department.     Patient denies any current skin issues.      Per Anesthesia Request, patient instructed not to take their ACE/ARB medications on the AM of surgery.     Patient instructed to drink 20 ounces of Gatorade or Gatorlyte (if diabetic) and it needs to be completed 1 hour (for Main OR patients) or 2 hours (scheduled  section & [facility] patients) before given arrival time for procedure (NO RED Gatorade and NO Gatorade Zero).    Patient verbalized understanding.     Patient to apply Chlorhexadine wipes  to surgical area (as instructed) the night before procedure and the AM of procedure. Wipes provided.

## 2024-04-11 LAB
QT INTERVAL: 440 MS
QTC INTERVAL: 440 MS

## 2024-04-15 ENCOUNTER — ANESTHESIA EVENT (OUTPATIENT)
Dept: PERIOP | Facility: HOSPITAL | Age: 79
End: 2024-04-15
Payer: MEDICARE

## 2024-04-15 RX ORDER — SODIUM CHLORIDE 9 MG/ML
40 INJECTION, SOLUTION INTRAVENOUS AS NEEDED
Status: CANCELLED | OUTPATIENT
Start: 2024-04-15

## 2024-04-15 RX ORDER — SODIUM CHLORIDE 0.9 % (FLUSH) 0.9 %
10 SYRINGE (ML) INJECTION AS NEEDED
Status: CANCELLED | OUTPATIENT
Start: 2024-04-15

## 2024-04-15 RX ORDER — FAMOTIDINE 10 MG/ML
20 INJECTION, SOLUTION INTRAVENOUS ONCE
Status: CANCELLED | OUTPATIENT
Start: 2024-04-15 | End: 2024-04-15

## 2024-04-16 ENCOUNTER — ANESTHESIA EVENT CONVERTED (OUTPATIENT)
Dept: ANESTHESIOLOGY | Facility: HOSPITAL | Age: 79
End: 2024-04-16
Payer: MEDICARE

## 2024-04-16 ENCOUNTER — ANESTHESIA (OUTPATIENT)
Dept: PERIOP | Facility: HOSPITAL | Age: 79
End: 2024-04-16
Payer: MEDICARE

## 2024-04-16 ENCOUNTER — HOSPITAL ENCOUNTER (OUTPATIENT)
Facility: HOSPITAL | Age: 79
Discharge: HOME | End: 2024-04-17
Attending: UROLOGY | Admitting: UROLOGY
Payer: MEDICARE

## 2024-04-16 DIAGNOSIS — N81.10 VAGINAL PROLAPSE: Primary | ICD-10-CM

## 2024-04-16 PROBLEM — N81.4 UTEROVAGINAL PROLAPSE: Status: ACTIVE | Noted: 2024-04-16

## 2024-04-16 LAB
GLUCOSE BLDC GLUCOMTR-MCNC: 119 MG/DL (ref 70–130)
GLUCOSE BLDC GLUCOMTR-MCNC: 126 MG/DL (ref 70–130)
GLUCOSE BLDC GLUCOMTR-MCNC: 132 MG/DL (ref 70–130)
GLUCOSE BLDC GLUCOMTR-MCNC: 178 MG/DL (ref 70–130)
POTASSIUM SERPL-SCNC: 3.9 MMOL/L (ref 3.5–5.2)

## 2024-04-16 PROCEDURE — 25010000002 ONDANSETRON PER 1 MG

## 2024-04-16 PROCEDURE — 63710000001 TIMOLOL 0.5 % SOLUTION 5 ML BOTTLE: Performed by: UROLOGY

## 2024-04-16 PROCEDURE — 63710000001 PANTOPRAZOLE 40 MG TABLET DELAYED-RELEASE: Performed by: UROLOGY

## 2024-04-16 PROCEDURE — 84132 ASSAY OF SERUM POTASSIUM: CPT | Performed by: ANESTHESIOLOGY

## 2024-04-16 PROCEDURE — 63710000001 CETIRIZINE 10 MG TABLET: Performed by: UROLOGY

## 2024-04-16 PROCEDURE — A9270 NON-COVERED ITEM OR SERVICE: HCPCS | Performed by: UROLOGY

## 2024-04-16 PROCEDURE — A9270 NON-COVERED ITEM OR SERVICE: HCPCS | Performed by: INTERNAL MEDICINE

## 2024-04-16 PROCEDURE — 25010000002 CEFOXITIN PER 1 G: Performed by: UROLOGY

## 2024-04-16 PROCEDURE — 82948 REAGENT STRIP/BLOOD GLUCOSE: CPT

## 2024-04-16 PROCEDURE — 25810000003 SODIUM CHLORIDE PER 500 ML: Performed by: UROLOGY

## 2024-04-16 PROCEDURE — 63710000001 LISINOPRIL 20 MG TABLET: Performed by: UROLOGY

## 2024-04-16 PROCEDURE — 25010000002 DEXAMETHASONE SODIUM PHOSPHATE 10 MG/ML SOLUTION

## 2024-04-16 PROCEDURE — 25010000002 BUPIVACAINE (PF) 0.25 % SOLUTION

## 2024-04-16 PROCEDURE — 63710000001 INSULIN LISPRO (HUMAN) PER 5 UNITS: Performed by: INTERNAL MEDICINE

## 2024-04-16 PROCEDURE — 63710000001 FLUOXETINE 20 MG CAPSULE: Performed by: UROLOGY

## 2024-04-16 PROCEDURE — 25810000003 LACTATED RINGERS PER 1000 ML: Performed by: ANESTHESIOLOGY

## 2024-04-16 PROCEDURE — C1763 CONN TISS, NON-HUMAN: HCPCS | Performed by: UROLOGY

## 2024-04-16 PROCEDURE — 63710000001 ACETAMINOPHEN 325 MG TABLET: Performed by: UROLOGY

## 2024-04-16 PROCEDURE — 25010000002 PROPOFOL 10 MG/ML EMULSION

## 2024-04-16 PROCEDURE — 63710000001 GABAPENTIN 100 MG CAPSULE: Performed by: UROLOGY

## 2024-04-16 PROCEDURE — 63710000001 DORZOLAMIDE 2 % SOLUTION 10 ML BOTTLE: Performed by: UROLOGY

## 2024-04-16 PROCEDURE — 25010000002 SUGAMMADEX 200 MG/2ML SOLUTION

## 2024-04-16 PROCEDURE — 25010000002 FENTANYL CITRATE (PF) 100 MCG/2ML SOLUTION

## 2024-04-16 PROCEDURE — 25010000002 SODIUM CHLORIDE 0.9 % WITH KCL 20 MEQ 20-0.9 MEQ/L-% SOLUTION: Performed by: UROLOGY

## 2024-04-16 DEVICE — TRADITIONAL Y MESH KIT
Type: IMPLANTABLE DEVICE | Site: PELVIS | Status: FUNCTIONAL
Brand: UPSYLON™ Y MESH KIT

## 2024-04-16 DEVICE — DEV CONTRL TISS STRATAFIX SPIRAL MNCRYL PLS SH 2/0 15CM UD: Type: IMPLANTABLE DEVICE | Site: PELVIS | Status: FUNCTIONAL

## 2024-04-16 RX ORDER — LISINOPRIL 20 MG/1
20 TABLET ORAL DAILY
Status: DISCONTINUED | OUTPATIENT
Start: 2024-04-16 | End: 2024-04-17 | Stop reason: HOSPADM

## 2024-04-16 RX ORDER — LABETALOL HYDROCHLORIDE 5 MG/ML
10 INJECTION, SOLUTION INTRAVENOUS EVERY 4 HOURS PRN
Status: DISCONTINUED | OUTPATIENT
Start: 2024-04-16 | End: 2024-04-17 | Stop reason: HOSPADM

## 2024-04-16 RX ORDER — ACETAMINOPHEN 325 MG/1
650 TABLET ORAL EVERY 6 HOURS
Status: DISCONTINUED | OUTPATIENT
Start: 2024-04-16 | End: 2024-04-17 | Stop reason: HOSPADM

## 2024-04-16 RX ORDER — SODIUM CHLORIDE 0.9 % (FLUSH) 0.9 %
10 SYRINGE (ML) INJECTION EVERY 12 HOURS SCHEDULED
Status: DISCONTINUED | OUTPATIENT
Start: 2024-04-16 | End: 2024-04-16 | Stop reason: HOSPADM

## 2024-04-16 RX ORDER — CEFOXITIN 2 G/1
2 INJECTION, POWDER, FOR SOLUTION INTRAVENOUS ONCE
Status: DISCONTINUED | OUTPATIENT
Start: 2024-04-16 | End: 2024-04-16

## 2024-04-16 RX ORDER — DORZOLAMIDE HYDROCHLORIDE AND TIMOLOL MALEATE 20; 5 MG/ML; MG/ML
SOLUTION/ DROPS OPHTHALMIC 2 TIMES DAILY
Status: DISCONTINUED | OUTPATIENT
Start: 2024-04-16 | End: 2024-04-17 | Stop reason: HOSPADM

## 2024-04-16 RX ORDER — FLUOXETINE HYDROCHLORIDE 20 MG/1
40 CAPSULE ORAL DAILY
Status: DISCONTINUED | OUTPATIENT
Start: 2024-04-16 | End: 2024-04-17 | Stop reason: HOSPADM

## 2024-04-16 RX ORDER — LIDOCAINE HYDROCHLORIDE 10 MG/ML
INJECTION, SOLUTION EPIDURAL; INFILTRATION; INTRACAUDAL; PERINEURAL AS NEEDED
Status: DISCONTINUED | OUTPATIENT
Start: 2024-04-16 | End: 2024-04-16 | Stop reason: SURG

## 2024-04-16 RX ORDER — FAMOTIDINE 20 MG/1
20 TABLET, FILM COATED ORAL ONCE
Status: COMPLETED | OUTPATIENT
Start: 2024-04-16 | End: 2024-04-16

## 2024-04-16 RX ORDER — SODIUM CHLORIDE AND POTASSIUM CHLORIDE 150; 900 MG/100ML; MG/100ML
100 INJECTION, SOLUTION INTRAVENOUS CONTINUOUS
Status: DISCONTINUED | OUTPATIENT
Start: 2024-04-16 | End: 2024-04-17 | Stop reason: HOSPADM

## 2024-04-16 RX ORDER — DROPERIDOL 2.5 MG/ML
0.62 INJECTION, SOLUTION INTRAMUSCULAR; INTRAVENOUS ONCE AS NEEDED
Status: DISCONTINUED | OUTPATIENT
Start: 2024-04-16 | End: 2024-04-16 | Stop reason: HOSPADM

## 2024-04-16 RX ORDER — INSULIN LISPRO 100 [IU]/ML
2-7 INJECTION, SOLUTION INTRAVENOUS; SUBCUTANEOUS
Status: DISCONTINUED | OUTPATIENT
Start: 2024-04-16 | End: 2024-04-17 | Stop reason: HOSPADM

## 2024-04-16 RX ORDER — SODIUM CHLORIDE 9 MG/ML
INJECTION, SOLUTION INTRAVENOUS AS NEEDED
Status: DISCONTINUED | OUTPATIENT
Start: 2024-04-16 | End: 2024-04-16 | Stop reason: HOSPADM

## 2024-04-16 RX ORDER — ONDANSETRON 4 MG/1
4 TABLET, ORALLY DISINTEGRATING ORAL EVERY 6 HOURS PRN
Status: DISCONTINUED | OUTPATIENT
Start: 2024-04-16 | End: 2024-04-17 | Stop reason: HOSPADM

## 2024-04-16 RX ORDER — OXYCODONE HYDROCHLORIDE 5 MG/1
5 TABLET ORAL EVERY 4 HOURS PRN
Status: DISCONTINUED | OUTPATIENT
Start: 2024-04-16 | End: 2024-04-17 | Stop reason: HOSPADM

## 2024-04-16 RX ORDER — LIDOCAINE HYDROCHLORIDE 10 MG/ML
0.5 INJECTION, SOLUTION EPIDURAL; INFILTRATION; INTRACAUDAL; PERINEURAL ONCE AS NEEDED
Status: COMPLETED | OUTPATIENT
Start: 2024-04-16 | End: 2024-04-16

## 2024-04-16 RX ORDER — HYDROMORPHONE HYDROCHLORIDE 1 MG/ML
0.5 INJECTION, SOLUTION INTRAMUSCULAR; INTRAVENOUS; SUBCUTANEOUS
Status: DISCONTINUED | OUTPATIENT
Start: 2024-04-16 | End: 2024-04-17 | Stop reason: HOSPADM

## 2024-04-16 RX ORDER — PROPOFOL 10 MG/ML
VIAL (ML) INTRAVENOUS AS NEEDED
Status: DISCONTINUED | OUTPATIENT
Start: 2024-04-16 | End: 2024-04-16 | Stop reason: SURG

## 2024-04-16 RX ORDER — IBUPROFEN 600 MG/1
1 TABLET ORAL
Status: DISCONTINUED | OUTPATIENT
Start: 2024-04-16 | End: 2024-04-17 | Stop reason: HOSPADM

## 2024-04-16 RX ORDER — NICOTINE POLACRILEX 4 MG
15 LOZENGE BUCCAL
Status: DISCONTINUED | OUTPATIENT
Start: 2024-04-16 | End: 2024-04-17 | Stop reason: HOSPADM

## 2024-04-16 RX ORDER — DOXYCYCLINE HYCLATE 100 MG/1
100 CAPSULE ORAL DAILY
Qty: 7 CAPSULE | Refills: 0 | Status: SHIPPED | OUTPATIENT
Start: 2024-04-16 | End: 2024-04-24

## 2024-04-16 RX ORDER — PANTOPRAZOLE SODIUM 40 MG/1
40 TABLET, DELAYED RELEASE ORAL
Status: DISCONTINUED | OUTPATIENT
Start: 2024-04-16 | End: 2024-04-17 | Stop reason: HOSPADM

## 2024-04-16 RX ORDER — EPHEDRINE SULFATE 50 MG/ML
INJECTION INTRAVENOUS AS NEEDED
Status: DISCONTINUED | OUTPATIENT
Start: 2024-04-16 | End: 2024-04-16 | Stop reason: SURG

## 2024-04-16 RX ORDER — ACETAMINOPHEN 650 MG/1
650 SUPPOSITORY RECTAL EVERY 6 HOURS
Status: DISCONTINUED | OUTPATIENT
Start: 2024-04-16 | End: 2024-04-17 | Stop reason: HOSPADM

## 2024-04-16 RX ORDER — FENTANYL CITRATE 50 UG/ML
50 INJECTION, SOLUTION INTRAMUSCULAR; INTRAVENOUS
Status: DISCONTINUED | OUTPATIENT
Start: 2024-04-16 | End: 2024-04-16 | Stop reason: HOSPADM

## 2024-04-16 RX ORDER — GABAPENTIN 100 MG/1
100 CAPSULE ORAL 3 TIMES DAILY
Status: DISCONTINUED | OUTPATIENT
Start: 2024-04-16 | End: 2024-04-17 | Stop reason: HOSPADM

## 2024-04-16 RX ORDER — TRIAMTERENE AND HYDROCHLOROTHIAZIDE 37.5; 25 MG/1; MG/1
1 TABLET ORAL DAILY
Status: DISCONTINUED | OUTPATIENT
Start: 2024-04-16 | End: 2024-04-17 | Stop reason: HOSPADM

## 2024-04-16 RX ORDER — DEXTROSE MONOHYDRATE 25 G/50ML
25 INJECTION, SOLUTION INTRAVENOUS
Status: DISCONTINUED | OUTPATIENT
Start: 2024-04-16 | End: 2024-04-17 | Stop reason: HOSPADM

## 2024-04-16 RX ORDER — ENOXAPARIN SODIUM 100 MG/ML
40 INJECTION SUBCUTANEOUS DAILY
Status: DISCONTINUED | OUTPATIENT
Start: 2024-04-17 | End: 2024-04-17 | Stop reason: HOSPADM

## 2024-04-16 RX ORDER — DEXAMETHASONE SODIUM PHOSPHATE 10 MG/ML
INJECTION, SOLUTION INTRAMUSCULAR; INTRAVENOUS AS NEEDED
Status: DISCONTINUED | OUTPATIENT
Start: 2024-04-16 | End: 2024-04-16 | Stop reason: SURG

## 2024-04-16 RX ORDER — ROCURONIUM BROMIDE 10 MG/ML
INJECTION, SOLUTION INTRAVENOUS AS NEEDED
Status: DISCONTINUED | OUTPATIENT
Start: 2024-04-16 | End: 2024-04-16 | Stop reason: SURG

## 2024-04-16 RX ORDER — ONDANSETRON 2 MG/ML
4 INJECTION INTRAMUSCULAR; INTRAVENOUS EVERY 6 HOURS PRN
Status: DISCONTINUED | OUTPATIENT
Start: 2024-04-16 | End: 2024-04-17 | Stop reason: HOSPADM

## 2024-04-16 RX ORDER — NALOXONE HCL 0.4 MG/ML
0.1 VIAL (ML) INJECTION
Status: DISCONTINUED | OUTPATIENT
Start: 2024-04-16 | End: 2024-04-17 | Stop reason: HOSPADM

## 2024-04-16 RX ORDER — DOCUSATE SODIUM 100 MG/1
100 CAPSULE, LIQUID FILLED ORAL 2 TIMES DAILY PRN
Status: DISCONTINUED | OUTPATIENT
Start: 2024-04-16 | End: 2024-04-17 | Stop reason: HOSPADM

## 2024-04-16 RX ORDER — FENTANYL CITRATE 50 UG/ML
INJECTION, SOLUTION INTRAMUSCULAR; INTRAVENOUS AS NEEDED
Status: DISCONTINUED | OUTPATIENT
Start: 2024-04-16 | End: 2024-04-16 | Stop reason: SURG

## 2024-04-16 RX ORDER — ONDANSETRON 2 MG/ML
INJECTION INTRAMUSCULAR; INTRAVENOUS AS NEEDED
Status: DISCONTINUED | OUTPATIENT
Start: 2024-04-16 | End: 2024-04-16 | Stop reason: SURG

## 2024-04-16 RX ORDER — BUPIVACAINE HYDROCHLORIDE 2.5 MG/ML
INJECTION, SOLUTION EPIDURAL; INFILTRATION; INTRACAUDAL
Status: COMPLETED | OUTPATIENT
Start: 2024-04-16 | End: 2024-04-16

## 2024-04-16 RX ORDER — HYDROCODONE BITARTRATE AND ACETAMINOPHEN 7.5; 325 MG/1; MG/1
1 TABLET ORAL EVERY 6 HOURS PRN
Qty: 30 TABLET | Refills: 0 | Status: SHIPPED | OUTPATIENT
Start: 2024-04-16

## 2024-04-16 RX ORDER — MIDAZOLAM HYDROCHLORIDE 1 MG/ML
0.5 INJECTION INTRAMUSCULAR; INTRAVENOUS
Status: DISCONTINUED | OUTPATIENT
Start: 2024-04-16 | End: 2024-04-16 | Stop reason: HOSPADM

## 2024-04-16 RX ORDER — DEXAMETHASONE SODIUM PHOSPHATE 10 MG/ML
INJECTION, SOLUTION INTRAMUSCULAR; INTRAVENOUS
Status: COMPLETED | OUTPATIENT
Start: 2024-04-16 | End: 2024-04-16

## 2024-04-16 RX ORDER — CETIRIZINE HYDROCHLORIDE 10 MG/1
5 TABLET ORAL DAILY
Status: DISCONTINUED | OUTPATIENT
Start: 2024-04-16 | End: 2024-04-17 | Stop reason: HOSPADM

## 2024-04-16 RX ORDER — DOCUSATE SODIUM 100 MG/1
100 CAPSULE, LIQUID FILLED ORAL DAILY PRN
Qty: 30 CAPSULE | Refills: 1 | Status: SHIPPED | OUTPATIENT
Start: 2024-04-16 | End: 2025-04-16

## 2024-04-16 RX ORDER — SODIUM CHLORIDE, SODIUM LACTATE, POTASSIUM CHLORIDE, CALCIUM CHLORIDE 600; 310; 30; 20 MG/100ML; MG/100ML; MG/100ML; MG/100ML
9 INJECTION, SOLUTION INTRAVENOUS CONTINUOUS
Status: DISCONTINUED | OUTPATIENT
Start: 2024-04-16 | End: 2024-04-17 | Stop reason: HOSPADM

## 2024-04-16 RX ORDER — ATENOLOL 25 MG/1
25 TABLET ORAL
Status: DISCONTINUED | OUTPATIENT
Start: 2024-04-16 | End: 2024-04-17 | Stop reason: HOSPADM

## 2024-04-16 RX ORDER — ACETAMINOPHEN 160 MG/5ML
650 SOLUTION ORAL EVERY 6 HOURS
Status: DISCONTINUED | OUTPATIENT
Start: 2024-04-16 | End: 2024-04-17 | Stop reason: HOSPADM

## 2024-04-16 RX ORDER — PRAVASTATIN SODIUM 40 MG
40 TABLET ORAL DAILY
Status: DISCONTINUED | OUTPATIENT
Start: 2024-04-16 | End: 2024-04-17 | Stop reason: HOSPADM

## 2024-04-16 RX ADMIN — CEFOXITIN SODIUM 2000 MG: 2 POWDER, FOR SOLUTION INTRAVENOUS at 07:38

## 2024-04-16 RX ADMIN — CEFOXITIN SODIUM 1000 MG: 1 POWDER, FOR SOLUTION INTRAVENOUS at 23:46

## 2024-04-16 RX ADMIN — ROCURONIUM BROMIDE 10 MG: 10 INJECTION INTRAVENOUS at 08:19

## 2024-04-16 RX ADMIN — SODIUM CHLORIDE, POTASSIUM CHLORIDE, SODIUM LACTATE AND CALCIUM CHLORIDE 9 ML/HR: 600; 310; 30; 20 INJECTION, SOLUTION INTRAVENOUS at 07:04

## 2024-04-16 RX ADMIN — LIDOCAINE HYDROCHLORIDE 50 MG: 10 INJECTION, SOLUTION EPIDURAL; INFILTRATION; INTRACAUDAL; PERINEURAL at 07:26

## 2024-04-16 RX ADMIN — DEXAMETHASONE SODIUM PHOSPHATE 6 MG: 10 INJECTION, SOLUTION INTRAMUSCULAR; INTRAVENOUS at 07:34

## 2024-04-16 RX ADMIN — ACETAMINOPHEN 650 MG: 325 TABLET ORAL at 23:45

## 2024-04-16 RX ADMIN — PROPOFOL 150 MG: 10 INJECTION, EMULSION INTRAVENOUS at 07:26

## 2024-04-16 RX ADMIN — DEXAMETHASONE SODIUM PHOSPHATE 4 MG: 10 INJECTION, SOLUTION INTRAMUSCULAR; INTRAVENOUS at 07:32

## 2024-04-16 RX ADMIN — PANTOPRAZOLE SODIUM 40 MG: 40 TABLET, DELAYED RELEASE ORAL at 11:24

## 2024-04-16 RX ADMIN — ONDANSETRON 4 MG: 2 INJECTION INTRAMUSCULAR; INTRAVENOUS at 08:48

## 2024-04-16 RX ADMIN — GABAPENTIN 100 MG: 100 CAPSULE ORAL at 20:51

## 2024-04-16 RX ADMIN — DORZOLAMIDE HYDROCHLORIDE: 20 SOLUTION/ DROPS OPHTHALMIC at 20:51

## 2024-04-16 RX ADMIN — CETIRIZINE HYDROCHLORIDE 5 MG: 10 TABLET, FILM COATED ORAL at 11:24

## 2024-04-16 RX ADMIN — ACETAMINOPHEN 650 MG: 325 TABLET ORAL at 17:35

## 2024-04-16 RX ADMIN — INSULIN LISPRO 2 UNITS: 100 INJECTION, SOLUTION INTRAVENOUS; SUBCUTANEOUS at 17:35

## 2024-04-16 RX ADMIN — FENTANYL CITRATE 100 MCG: 50 INJECTION, SOLUTION INTRAMUSCULAR; INTRAVENOUS at 07:33

## 2024-04-16 RX ADMIN — CEFOXITIN SODIUM 1000 MG: 1 POWDER, FOR SOLUTION INTRAVENOUS at 17:35

## 2024-04-16 RX ADMIN — FLUOXETINE 40 MG: 20 CAPSULE ORAL at 11:26

## 2024-04-16 RX ADMIN — POTASSIUM CHLORIDE AND SODIUM CHLORIDE 100 ML/HR: 900; 150 INJECTION, SOLUTION INTRAVENOUS at 11:25

## 2024-04-16 RX ADMIN — LIDOCAINE HYDROCHLORIDE 0.5 ML: 10 INJECTION, SOLUTION EPIDURAL; INFILTRATION; INTRACAUDAL; PERINEURAL at 07:04

## 2024-04-16 RX ADMIN — ROCURONIUM BROMIDE 50 MG: 10 INJECTION INTRAVENOUS at 07:26

## 2024-04-16 RX ADMIN — ACETAMINOPHEN 650 MG: 325 TABLET ORAL at 11:24

## 2024-04-16 RX ADMIN — FAMOTIDINE 20 MG: 20 TABLET, FILM COATED ORAL at 07:04

## 2024-04-16 RX ADMIN — LISINOPRIL 20 MG: 20 TABLET ORAL at 11:25

## 2024-04-16 RX ADMIN — GABAPENTIN 100 MG: 100 CAPSULE ORAL at 16:13

## 2024-04-16 RX ADMIN — SUGAMMADEX 200 MG: 100 INJECTION, SOLUTION INTRAVENOUS at 08:50

## 2024-04-16 RX ADMIN — EPHEDRINE SULFATE 5 MG: 50 INJECTION INTRAVENOUS at 07:44

## 2024-04-16 RX ADMIN — DORZOLAMIDE HYDROCHLORIDE: 20 SOLUTION/ DROPS OPHTHALMIC at 13:54

## 2024-04-16 RX ADMIN — BUPIVACAINE HYDROCHLORIDE 60 ML: 2.5 INJECTION, SOLUTION EPIDURAL; INFILTRATION; INTRACAUDAL; PERINEURAL at 07:32

## 2024-04-16 RX ADMIN — PROPOFOL 25 MCG/KG/MIN: 10 INJECTION, EMULSION INTRAVENOUS at 07:33

## 2024-04-16 NOTE — CASE MANAGEMENT/SOCIAL WORK
Discharge Planning Assessment  [facility]     Patient Name: Norma Fletcher  MRN: [id number]  Today's Date: 4/16/2024    Admit Date: 4/16/2024    Plan: Home   Discharge Needs Assessment       Row Name 04/16/24 1537       Living Environment    People in Home child(miryam), adult;spouse    Current Living Arrangements home    Potentially Unsafe Housing Conditions none    Primary Care Provided by self    Provides Primary Care For no one    Family Caregiver if Needed child(miryam), adult;spouse    Quality of Family Relationships unable to assess    Able to Return to Prior Arrangements yes       Resource/Environmental Concerns    Resource/Environmental Concerns none       Transition Planning    Patient/Family Anticipates Transition to home with family    Patient/Family Anticipated Services at Transition     Transportation Anticipated family or friend will provide       Discharge Needs Assessment    Readmission Within the Last 30 Days no previous admission in last 30 days    Equipment Currently Used at Home none    Concerns to be Addressed discharge planning    Anticipated Changes Related to Illness none    Equipment Needed After Discharge none                   Discharge Plan       Row Name 04/16/24 1537       Plan    Plan Home    Patient/Family in Agreement with Plan yes    Plan Comments Spoke with patient in room to initiate discharge planning.  She lives with her , son and DIL in [facility].  She is independent with ADL's.  She has no DME at home and is not current with home health.  Her PCP is Stephanie Terry.  She does not have an advanced directive.  Verified that she has [employer]placement.  Ms. Fletcher has RX coverage and has her scripts filled at [facility].  Her plan is to return home at discharge No needs noted at this time.  CM will continue to follow.    Final Discharge Disposition Code 01 - home or self-care                  Continued Care and Services - Admitted Since 4/16/2024    No  active coordination exists for this encounter.       Expected Discharge Date and Time       Expected Discharge Date Expected Discharge Time    Apr 17, 2024            Demographic Summary       Row Name 04/16/24 1536       General Information    Admission Type other (see comments)  outpatient    Arrived From PACU/recovery room    Referral Source admission list    Reason for Consult discharge planning    Preferred Language English                   Functional Status       Row Name 04/16/24 1536       Functional Status    Usual Activity Tolerance moderate    Current Activity Tolerance moderate       Functional Status, IADL    Medications independent    Meal Preparation independent    Housekeeping independent    Laundry independent    Shopping independent                   Psychosocial    No documentation.                  Abuse/Neglect    No documentation.                  Legal    No documentation.                  Substance Abuse    No documentation.                  Patient Forms    No documentation.                     Caroline Krishnamurthy RN

## 2024-04-16 NOTE — PLAN OF CARE
Problem: Adult Inpatient Plan of Care  Goal: Optimal Comfort and Wellbeing  Outcome: Ongoing, Progressing  Intervention: Provide Person-Centered Care  Recent Flowsheet Documentation  Taken 4/16/2024 1015 by Joseph Clinton, RN  Trust Relationship/Rapport:   care explained   choices provided   Goal Outcome Evaluation:  Plan of Care Reviewed With: patient        Progress: improving       Tolerating full liquid diet. Stevens in place. Pain controlled. No complaints.

## 2024-04-16 NOTE — ANESTHESIA PREPROCEDURE EVALUATION
Anesthesia Evaluation     Patient summary reviewed and Nursing notes reviewed   no history of anesthetic complications:   NPO Solid Status: > 8 hours  NPO Liquid Status: > 2 hours           Airway   Mallampati: I  TM distance: >3 FB  Neck ROM: full  No difficulty expected  Dental    (+) partials and upper dentures    Pulmonary    (-) COPD, asthma, sleep apnea, not a smoker  Cardiovascular     ECG reviewed    (+) hypertension, hyperlipidemia  (-) dysrhythmias, angina, orthopnea, cardiac stents      Neuro/Psych  (-) seizures, CVA  GI/Hepatic/Renal/Endo    (+) GERD, hepatitis (not active), diabetes mellitus type 2    Musculoskeletal     Abdominal    Substance History      OB/GYN          Other   arthritis,                   Anesthesia Plan    ASA 3     general with block       Anesthetic plan, risks, benefits, and alternatives have been provided, discussed and informed consent has been obtained with: patient.    Plan discussed with CRNA.    CODE STATUS:

## 2024-04-16 NOTE — ANESTHESIA POSTPROCEDURE EVALUATION
Patient: Norma Fletcher    Procedure Summary       Date: 04/16/24 Room / Location:  [facility]  [facility]    Anesthesia Start: 0722 Anesthesia Stop: 0907    Procedure: SACROCOLPOPEXY LAPAROSCOPIC WITH DAVINCI ROBOT (Abdomen) Diagnosis:     Surgeons: Darci No Jr., MD Provider: Liborio Turner Jr., MD    Anesthesia Type: general with block ASA Status: 3            Anesthesia Type: general with block    Vitals  Vitals Value Taken Time   /67 04/16/24 0903   Temp     Pulse 65 04/16/24 0906   Resp     SpO2 100 % 04/16/24 0906   Vitals shown include unfiled device data.        Post Anesthesia Care and Evaluation    Patient location during evaluation: PACU  Patient participation: waiting for patient participation  Level of consciousness: sleepy but conscious  Pain management: adequate    Airway patency: patent  Anesthetic complications: No anesthetic complications  PONV Status: none  Cardiovascular status: hemodynamically stable and acceptable  Respiratory status: nonlabored ventilation, acceptable and nasal cannula  Hydration status: acceptable

## 2024-04-16 NOTE — ANESTHESIA PROCEDURE NOTES
Peripheral Block    Pre-sedation assessment completed: 4/16/2024 6:56 AM    Patient reassessed immediately prior to procedure    Patient location during procedure: OR  Start time: 4/16/2024 7:28 AM  Stop time: 4/16/2024 7:32 AM  Reason for block: at surgeon's request and post-op pain management  Performed by  Anesthesiologist: Liborio Turner Jr., MD  Preanesthetic Checklist  Completed: patient identified, IV checked, site marked, risks and benefits discussed, surgical consent, monitors and equipment checked, pre-op evaluation and timeout performed  Prep:  Pt Position: supine  Sterile barriers:cap, gloves, mask and washed/disinfected hands  Prep: ChloraPrep  Patient monitoring: blood pressure monitoring, continuous pulse oximetry and EKG  Procedure    Sedation: yes  Performed under: general  Guidance:ultrasound guided  Images:still images obtained, printed/placed on chart    Laterality:Bilateral  Block Type:TAP  Injection Technique:single-shot  Needle Type:short-bevel and echogenic  Needle Gauge:20 G  Resistance on Injection: none    Medications Used: dexamethasone sodium phosphate injection - Injection   4 mg - 4/16/2024 7:32:00 AM  bupivacaine PF (MARCAINE) 0.25 % injection - Injection   60 mL - 4/16/2024 7:32:00 AM      Medications  Comment:Block Injection:  LA dose divided between Right and Left block        Post Assessment  Injection Assessment: negative aspiration for heme, incremental injection and no paresthesia on injection  Patient Tolerance:comfortable throughout block  Complications:no  Additional Notes    Subcostal TAP R side    A high-frequency linear transducer, with sterile cover, was placed sub-xiphoid to identify [place] Alba, right and left Rectus Abdominus Muscles (AR). The transducer was moved either right or left subcostally to identify the AR and the Transverse Abdominus Muscle (CARRION). The insertion site was prepped in sterile fashion and then localized with 2-5 ml of 1% Lidocaine. Using  "ultrasound-guidance, a 20-gauge B-Powers 4\" Ultraplex 360 non-stimulating echogenic needle was advanced in plane, from medial to lateral, until the tip of the needle was in the fascial plane between the AR and CARRION. 1-3ml of preservative free normal saline was used to hydro-dissect the fascial planes. After the fascial plane was verified, the local anesthetic (LA) was injected. The procedure was repeated on the opposite side for bilateral coverage. Aspiration every 5 ml to prevent intravascular injection. Injection was completed with negative aspiration of blood and negative intravascular injection. Injection pressures were normal with minimal resistance. The subcostal approach to the TAP nerve block ideally anesthetizes the intercostal nerves T6-T9.     Mid-Axillary/Lateral TAPs    A high-frequency linear transducer, with sterile cover, was placed in the midaxillary line between the ASIS and costal margin. The External Oblique Muscle (EOM), Internal Oblique Muscle (IOM), Transverse Abdominus Muscle (CARRION), and Peritoneum were identified. The insertion site was prepped in sterile fashion and then localized with 2-5 ml of 1% Lidocaine. Using ultrasound-guidance, a 20-gauge B-Powers 4\" Ultraplex 360 non-stimulating echogenic needle was advanced in plane, from medial to lateral, until the tip of the needle was in the fascial plane between the IOM and CARRION. 1-3ml of preservative free normal saline was used to hydro-dissect the fascial planes. After the fascial plane was verified, the local anesthetic (LA) was injected. The procedure was repeated on the opposite side for bilateral coverage. Aspiration every 5 ml to prevent intravascular injection. Injection was completed with negative aspiration of blood and negative intravascular injection. Injection pressures were normal with minimal resistance. Midaxillary TAPs should reach intercostal nerves T10- T11 and the subcostal nerve T12.              "

## 2024-04-16 NOTE — H&P
Admission      Patient Name: Norma Fletcher  MRN: [id number]  : 1945  DOS: 2024    Attending: Darci No Jr*    Primary Care Provider: Stephanie Terry DO      Patient Care Team:  Stephanie Terry DO as PCP - General (Family Medicine)  Stephanie Velazquez MD as Obstetrician (Obstetrics and Gynecology)    Chief complaint:  Utervaginal prolapse.     Subjective   Patient is a pleasant 78 y.o. female presented for scheduled surgery by Dr. Oneal Garcia.    Per his note (78-year-old female with symptomatic uterovaginal prolapse. She was found to have stress incontinence on urodynamics testing. She opts for surgical therapy understanding risk benefits and alternatives.).    Patient underwent robotic laparoscopic sacrocolpopexy under general anesthesia and a block, tolerated surgery well.  She was admitted for further management.    Seen in her room postop, doing well with good pain control, no complaints of nausea vomiting, or shortness of breath.    Reviewed with patient her past medical history and home medications.    Allergies:  No Known Allergies    Meds:  Medications Prior to Admission   Medication Sig Dispense Refill Last Dose    aspirin 81 MG EC tablet Take 1 tablet by mouth Daily.   4/15/2024    calcium carbonate (OS-KEYANNA) 600 MG tablet Take 1 tablet by mouth Daily.   4/15/2024    dorzolamide-timolol (COSOPT) 22.3-6.8 MG/ML ophthalmic solution Administer 1 drop to both eyes 2 (Two) Times a Day.   4/15/2024    esomeprazole (nexIUM) 20 MG capsule Take 1 capsule by mouth Every Morning Before Breakfast.   4/15/2024    ferrous sulfate 324 (65 Fe) MG tablet delayed-release EC tablet Take 1 tablet by mouth 3 (Three) Times a Week.   4/15/2024    FLUoxetine (PROzac) 40 MG capsule Take 1 capsule by mouth Daily. 90 capsule 1 4/15/2024    lisinopril (PRINIVIL,ZESTRIL) 20 MG tablet Take 1 tablet by mouth Daily. 90 tablet 1 4/15/2024    loratadine (Claritin) 10 MG tablet Take 0.5 tablets by mouth Daily. 15  tablet 1 4/15/2024    Magnesium Oxide -Mg Supplement 400 (240 Mg) MG tablet Take 1 tablet by mouth Daily.   4/15/2024    metFORMIN (GLUCOPHAGE) 500 MG tablet TAKE 1 & 1/2 (ONE & ONE-HALF) TABLETS BY MOUTH TWICE DAILY (Patient taking differently: Take 1.5 tablets by mouth 2 (Two) Times a Day With Meals. TAKE 1 & 1/2 (ONE & ONE-HALF) TABLETS BY MOUTH TWICE DAILY) 270 tablet 1 4/15/2024    Multiple Vitamins-Minerals (b complex-C-E-zinc) tablet Take 1 tablet by mouth Daily.   4/15/2024    pravastatin (PRAVACHOL) 40 MG tablet Take 1 tablet by mouth Daily. 90 tablet 1 4/15/2024    triamterene-hydrochlorothiazide (DYAZIDE) 37.5-25 MG per capsule Take 1 capsule by mouth Every Morning. 90 capsule 1 4/15/2024    verapamil SR (CALAN-SR) 120 MG CR tablet Take 1 tablet by mouth Daily. 90 tablet 1 4/15/2024    vitamin C (ASCORBIC ACID) 250 MG tablet Take 2 tablets by mouth 3 (Three) Times a Week.   4/15/2024    vitamin D3 125 MCG (5000 UT) capsule capsule Take 1 capsule by mouth Daily.   4/15/2024    VITAMIN E 400 UNIT capsule Take 1 capsule by mouth Daily.   4/15/2024    Zinc 50 MG tablet Take 1 tablet by mouth Daily.   4/15/2024    atenolol (Tenormin) 25 MG tablet Take one tablet by mouth daily. Check pulse before taking, if pulse less than 60 bpm do not take atenolol (Patient not taking: Reported on 4/9/2024) 30 tablet 1     Trimo-Hanley 0.025-0.01 % gel Insert 1 dose into the vagina 2 (Two) Times a Week.   4/14/2024         History:   Past Medical History:   Diagnosis Date    Dyslipidemia     Dysphagia     Recurrent dysphagia secondary to Schatzki's ring; EGD 7/30/2014, 12/9/2015, Dr. Nicholson    Essential (primary) hypertension     Fibrocystic breast changes, bilateral     Fibrocystic breast disease     status post stereotactic biopsy 9/15/09    GERD (gastroesophageal reflux disease)     Hepatitis     A, B, and C, status post GI consult with no evidence of active disease    Hypertension     Iron deficiency     Mixed  hyperlipidemia     Rash and other nonspecific skin eruption     Synovial cyst of popliteal space (Baker), left knee     Type 2 diabetes mellitus without complications     Unilateral primary osteoarthritis, left knee     Visual impairment     Wears dentures     full upp3er and partial lower    Wears eyeglasses      Past Surgical History:   Procedure Laterality Date    BREAST SURGERY Bilateral     bialteral breast  biopsy benign    CATARACT EXTRACTION, BILATERAL  2022    COLONOSCOPY      multiple all negative, most recently 7/15/2020 completely normal, Dr. Nicholson, recommendation for five-year followup in the year      Family History   Problem Relation Age of Onset    Breast cancer Other     Colon cancer Other     Arthritis Mother     Hyperlipidemia Mother         Mother    Cancer Sister     COPD Brother      Social History     Tobacco Use    Smoking status: Former     Current packs/day: 0.00     Average packs/day: 0.5 packs/day for 7.0 years (3.5 ttl pk-yrs)     Types: Cigarettes     Start date:      Quit date:      Years since quittin.3     Passive exposure: Never    Smokeless tobacco: Never   Vaping Use    Vaping status: Never Used   Substance Use Topics    Alcohol use: Yes     Comment: Maybe  1 glass a month    Drug use: Never   Works in childcare 3 days a week.  Lives with her  and her son and daughter-in-law    Review of Systems  Pertinent items are noted in HPI    Vital Signs  /65 (BP Location: Right arm, Patient Position: Lying)   Pulse 56   Temp 97.5 °F (36.4 °C) (Axillary)   Resp 16   SpO2 94%     Physical Exam:    General Appearance:    Alert, cooperative, in no acute distress   Head:    Normocephalic, without obvious abnormality, atraumatic   Eyes:            Lids and lashes normal, conjunctivae and sclerae normal, no   icterus, no pallor, corneas clear    Ears:    Ears appear intact with no abnormalities noted   Throat:   No oral lesions, no thrush, oral mucosa moist  "  Neck:   No adenopathy, supple, trachea midline, no thyromegaly         Lungs:     Clear to auscultation,respirations regular, even and                   unlabored    Heart:    Regular rhythm and normal rate, normal S1 and S2, no            murmur, no gallop   Abdomen:      Soft and benign with clean incisions.   Genitalia:    Deferred  Stevens: Clear urine.   Extremities:   Moves all extremities well, no edema, no cyanosis, no              redness   Pulses:   Pulses palpable and equal bilaterally   Skin:   No bleeding, bruising or rash   Neurologic:   Cranial nerves 2 - 12 grossly intact, no gross deficit.           Invalid input(s): \"NEUTOPHILPCT\"  Results from last 7 days   Lab Units 04/16/24  0723   POTASSIUM mmol/L 3.9     Lab Results   Component Value Date    HGBA1C 6.40 (H) 11/07/2023      Latest Reference Range & Units 04/09/24 09:01   Sodium 136 - 145 mmol/L 140   Potassium 3.5 - 5.2 mmol/L 3.9   Chloride 98 - 107 mmol/L 102   CO2 22.0 - 29.0 mmol/L 28.0   Anion Gap 5.0 - 15.0 mmol/L 10.0   BUN 8 - 23 mg/dL 7 (L)   Creatinine 0.57 - 1.00 mg/dL 0.53 (L)   BUN/Creatinine Ratio 7.0 - 25.0  13.2   eGFR >60.0 mL/min/1.73 95.4   Glucose 65 - 99 mg/dL 114 (H)   Calcium 8.6 - 10.5 mg/dL 8.8   (L): Data is abnormally low  (H): Data is abnormally high     Latest Reference Range & Units 04/09/24 09:01   WBC 3.40 - 10.80 10*3/mm3 5.50   RBC 3.77 - 5.28 10*6/mm3 4.67   Hemoglobin 12.0 - 15.9 g/dL 12.8   Hematocrit 34.0 - 46.6 % 38.8   Platelets 140 - 450 10*3/mm3 363   RDW 12.3 - 15.4 % 14.2   MCV 79.0 - 97.0 fL 83.1   MCH 26.6 - 33.0 pg 27.4   MCHC 31.5 - 35.7 g/dL 33.0   MPV 6.0 - 12.0 fL 9.9   RDW-SD 37.0 - 54.0 fl 43.1     Assessment and Plan:        Uterovaginal prolapse, s/p robotic lap sacrocolpopexy 4/16/24    Controlled type 2 diabetes mellitus without complication, without long-term current use of insulin    Hypertension    Mixed hyperlipidemia    Vaginal prolapse      Plan    Admitted for further " management.  1. Ambulation, early mobilization, starting postop day 0  2. Pain control-prns   3. IS-encourage  4. DVT proph- Mechanicals and Lovenox SQ, starting in a.m.  5. Bowel regimen  6. Resume home medications as appropriate.  7. Monitor post-op labs and path.  8. Clear liquid diet, advance as tolerated with return of bowel function  9.Discharge planning.    - Hypertension:  Resume home medications as appropriate, formulary substitution when indicated.  Holding parameters.  Prn medications for elevated blood pressure.    -Dyslipidemia:  Resume home regimen Statin( formulary substitution when appropriate).    -DM type 2  Hgb A1C 6.4  Hold OHA as appropriate  FSBG AC/HS, ( q 6 when NPO), along with correction humalog.  Long acting insulin if needed.    Dragon disclaimer:  Part of this encounter note is an electronic transcription/translation of spoken language to printed text. The electronic translation of spoken language may permit erroneous, or at times, nonsensical words or phrases to be inadvertently transcribed; Although I have reviewed the note for such errors, some may still exist.      Maria Luz Chen MD  04/16/24  11:20 EDT

## 2024-04-16 NOTE — H&P
Pre-Op H&P  Norma Fletcher  [id number]  1945    Chief complaint: bladder prolapse     HPI:  Patient is a 78 y.o.female who presents with bladder prolapse that has been present for a number of years and continues to be an issue despite conservative treatment methods. She is scheduled for a laparoscopic robotic assisted sacrocolpopexy. She takes ASA routinely, last dose Sunday night.     Review of Systems:  General ROS: negative for chills, fever or skin lesions.  Cardiovascular ROS: no chest pain or dyspnea on exertion  Respiratory ROS: no cough, shortness of breath, or wheezing    Allergies: No Known Allergies    Home Meds:    No current facility-administered medications on file prior to encounter.     Current Outpatient Medications on File Prior to Encounter   Medication Sig Dispense Refill    aspirin 81 MG EC tablet Take 1 tablet by mouth Daily.      atenolol (Tenormin) 25 MG tablet Take one tablet by mouth daily. Check pulse before taking, if pulse less than 60 bpm do not take atenolol (Patient not taking: Reported on 4/9/2024) 30 tablet 1    calcium carbonate (OS-KEYANNA) 600 MG tablet Take 1 tablet by mouth Daily.      dorzolamide-timolol (COSOPT) 22.3-6.8 MG/ML ophthalmic solution Administer 1 drop to both eyes 2 (Two) Times a Day.      esomeprazole (nexIUM) 20 MG capsule Take 1 capsule by mouth Every Morning Before Breakfast.      ferrous sulfate 324 (65 Fe) MG tablet delayed-release EC tablet Take 1 tablet by mouth 3 (Three) Times a Week.      FLUoxetine (PROzac) 40 MG capsule Take 1 capsule by mouth Daily. 90 capsule 1    lisinopril (PRINIVIL,ZESTRIL) 20 MG tablet Take 1 tablet by mouth Daily. 90 tablet 1    loratadine (Claritin) 10 MG tablet Take 0.5 tablets by mouth Daily. 15 tablet 1    Magnesium Oxide -Mg Supplement 400 (240 Mg) MG tablet Take 1 tablet by mouth Daily.      metFORMIN (GLUCOPHAGE) 500 MG tablet TAKE 1 & 1/2 (ONE & ONE-HALF) TABLETS BY MOUTH TWICE DAILY (Patient taking differently: Take 1.5  tablets by mouth 2 (Two) Times a Day With Meals. TAKE 1 & 1/2 (ONE & ONE-HALF) TABLETS BY MOUTH TWICE DAILY) 270 tablet 1    Multiple Vitamins-Minerals (b complex-C-E-zinc) tablet Take 1 tablet by mouth Daily.      pravastatin (PRAVACHOL) 40 MG tablet Take 1 tablet by mouth Daily. 90 tablet 1    triamterene-hydrochlorothiazide (DYAZIDE) 37.5-25 MG per capsule Take 1 capsule by mouth Every Morning. 90 capsule 1    Trimo-Hanley 0.025-0.01 % gel Insert 1 dose into the vagina 2 (Two) Times a Week.      verapamil SR (CALAN-SR) 120 MG CR tablet Take 1 tablet by mouth Daily. 90 tablet 1    vitamin D3 125 MCG (5000 UT) capsule capsule Take 1 capsule by mouth Daily. (Patient not taking: Reported on 4/9/2024)         PMH:   Past Medical History:   Diagnosis Date    Dyslipidemia     Dysphagia     Recurrent dysphagia secondary to Schatzki's ring; EGD 7/30/2014, 12/9/2015, Dr. Nicholson    Essential (primary) hypertension     Fibrocystic breast changes, bilateral     Fibrocystic breast disease     status post stereotactic biopsy 9/15/09    GERD (gastroesophageal reflux disease)     Hepatitis     A, B, and C, status post GI consult with no evidence of active disease    Hypertension     Iron deficiency     Mixed hyperlipidemia     Rash and other nonspecific skin eruption     Synovial cyst of popliteal space (Baker), left knee     Type 2 diabetes mellitus without complications     Unilateral primary osteoarthritis, left knee     Visual impairment     Wears dentures     full upp3er and partial lower    Wears eyeglasses      PSH:    Past Surgical History:   Procedure Laterality Date    BREAST SURGERY Bilateral     bialteral breast  biopsy benign    CATARACT EXTRACTION, BILATERAL  02/2022    COLONOSCOPY      multiple all negative, most recently 7/15/2020 completely normal, Dr. Nicholson, recommendation for five-year followup in the year 2025       Immunization History:  Influenza: UTD  Pneumococcal: UTD  Tetanus: unsure    Social  History:   Tobacco:   Social History     Tobacco Use   Smoking Status Former    Current packs/day: 0.00    Average packs/day: 0.5 packs/day for 7.0 years (3.5 ttl pk-yrs)    Types: Cigarettes    Start date:     Quit date:     Years since quittin.3    Passive exposure: Never   Smokeless Tobacco Never      Alcohol:     Social History     Substance and Sexual Activity   Alcohol Use Yes    Comment: Maybe  1 glass a month       Vitals:        Vitals can be found in the MAR    Physical Exam:  General Appearance:    Alert, cooperative, no distress, appears stated age   Head:    Normocephalic, without obvious abnormality, atraumatic   Lungs:     Clear to auscultation bilaterally, respirations unlabored    Heart:   Regular rate and rhythm, no murmur, rub or gallop    Abdomen:    Soft, nontender. No rigidity or guarding.    Breast Exam:    deferred   Genitalia:    deferred   Extremities:   No cyanosis or edema   Skin:   Skin color, texture, turgor normal, no rashes or lesions   Neurologic:   Grossly intact   Results Review  LABS:  Lab Results   Component Value Date    WBC 5.50 2024    HGB 12.8 2024    HCT 38.8 2024    MCV 83.1 2024     2024    NEUTROABS 2.81 2023    GLUCOSE 114 (H) 2024    BUN 7 (L) 2024    CREATININE 0.53 (L) 2024     2024    K 3.9 2024     2024    CO2 28.0 2024    CALCIUM 8.8 2024    ALBUMIN 4.9 2023    AST 25 2023    ALT 19 2023    BILITOT 0.3 2023   A1c: 6.40%    RADIOLOGY:  No radiology results for the last 3 days     I reviewed the patient's new imaging results and agree with the interpretation.    Impression: bladder prolapse     Plan: laparoscopic robotic assisted sacrocolpopexy     Margarito Hilario PA-C   2024   06:50 EDT

## 2024-04-16 NOTE — ANESTHESIA PROCEDURE NOTES
Airway  Urgency: elective    Date/Time: 4/16/2024 7:30 AM  Airway not difficult    General Information and Staff    Patient location during procedure: OR  CRNA/CAA: Gracia Murry, TORRI    Indications and Patient Condition  Indications for airway management: airway protection    Preoxygenated: yes  MILS not maintained throughout  Mask difficulty assessment: 2 - vent by mask + OA or adjuvant +/- NMBA    Final Airway Details  Final airway type: endotracheal airway      Successful airway: ETT  Cuffed: yes   Successful intubation technique: direct laryngoscopy  Facilitating devices/methods: intubating stylet  Endotracheal tube insertion site: oral  Blade: Nick  Blade size: 3  ETT size (mm): 7.0  Cormack-Lehane Classification: grade I - full view of glottis  Placement verified by: chest auscultation and capnometry   Cuff volume (mL): 8  Measured from: lips  ETT/EBT  to lips (cm): 20  Number of attempts at approach: 1  Assessment: lips, teeth, and gum same as pre-op and atraumatic intubation    Additional Comments  Negative epigastric sounds, Breath sound equal bilaterally with symmetric chest rise and fall

## 2024-04-16 NOTE — OP NOTE
SACROCOLPOPEXY LAPAROSCOPIC WITH DAVINCI ROBOT  Procedure Note    Norma Fletcher  4/16/2024    Pre-op Diagnosis:   Uterovaginal prolapse    Post-op Diagnosis:     Uterovaginal prolapse    Procedure/CPT® Codes:      Procedure(s):  SACROCOLPOPEXY LAPAROSCOPIC WITH DAVINCI ROBOT    Surgeon(s):  Darci No Jr., MD    Anesthesia: General with Block    Staff:   Circulator: Bella Lynn RN  Scrub Person: Meg Downs  Nursing Assistant: Yan Pizarro  Assistant: Andrea Wang PA    Assistant: Andrea Wang PA Was needed to assist in this case with retraction, exposure, instrument placement.    Estimated Blood Loss: minimal  Urine Voided: * No values recorded between 4/16/2024  7:22 AM and 4/16/2024  8:47 AM *    Specimens:                None      Drains:   Urethral Catheter Silicone 16 Fr. (Active)       Findings: Uterovaginal prolapse    Complications: None    History: 78-year-old female with symptomatic uterovaginal prolapse.  She was found to have stress incontinence on urodynamics testing.  She opts for surgical therapy understanding risk benefits and alternatives.    Operative Report: After consent is obtained patient's brought to the operating suite where she is placed in supine position.  Anesthesia was induced.  She is carefully placed in the dorsolithotomy position padding all pressure points.  She was sterilely prepped and draped in normal fashion.  Veress needle technique was used at the umbilicus to create pneumoperitoneum.  8 mm blunt Visiport was also used at the umbilicus to create a camera trocar site.  The remainder the robotic and assistant trocars were placed under direct vision.  Patient's placed in steep Trendelenburg position.  Robot is docked.  Began the operation by reflecting the bowel out of the pelvis.  There were some omental adhesions to the anterior abdominal wall from a previous infraumbilical incision.  These were taken down sharply and with electrocautery.  No bowel injury  seen.  The sacral promontory was identified and we open the posterior peritoneum overlying the sacral promontory and dissected onto the anterior spinal ligament.  Right ureter was visualized throughout the process and was well away from the area of dissection.  At this point the uterus and vagina were delivered into the abdominal cavity with the vaginal manipulator.  We are able to dissect the bladder away from the anterior vagina and clear off of a nice area for placement of mesh.  We open the peritoneum between the body of the uterus and apex of vagina and fallopian tube.  Our mesh was fashioned and we pexed the anterior limb of the mesh to the anterior vagina using interrupted Shawnee-Junior suture.  Posterior limb was then pexed to the posterior aspect of the vagina using interrupted Shawnee-Junior suture.  Single arm of the mesh was then pexed to the anterior spinal ligament again using interrupted Shawnee-Junior suture.  Excess mesh was excised.  We made sure that we were well away from our right ureter.  At this point the mesh was extraperitoneal lized using V-Loc suture to close the posterior peritoneum.  Once the mesh was completely extraperitonealized we inspected the abdomen for any injury to the bowel or active bleeding.  Seeing none the robot was undocked and we removed our trocars under direct vision.  All incisions were irrigated.  Subcutaneous tissues brought together and 3-0 Vicryl.  Skin was brought together using Dermabond.  Anesthesia was reversed.  Stevens catheter was secured to the patient's leg.  Patient was taken to the recovery room in stable condition.    Darci No Jr., MD     Date: 4/16/2024  Time: 08:47 EDT

## 2024-04-17 VITALS
TEMPERATURE: 97 F | DIASTOLIC BLOOD PRESSURE: 60 MMHG | RESPIRATION RATE: 16 BRPM | OXYGEN SATURATION: 97 % | HEART RATE: 75 BPM | SYSTOLIC BLOOD PRESSURE: 97 MMHG

## 2024-04-17 LAB
GLUCOSE BLDC GLUCOMTR-MCNC: 94 MG/DL (ref 70–130)
GLUCOSE BLDC GLUCOMTR-MCNC: 96 MG/DL (ref 70–130)

## 2024-04-17 PROCEDURE — 63710000001 PANTOPRAZOLE 40 MG TABLET DELAYED-RELEASE: Performed by: UROLOGY

## 2024-04-17 PROCEDURE — A9270 NON-COVERED ITEM OR SERVICE: HCPCS | Performed by: UROLOGY

## 2024-04-17 PROCEDURE — 63710000001 GABAPENTIN 100 MG CAPSULE: Performed by: UROLOGY

## 2024-04-17 PROCEDURE — 82948 REAGENT STRIP/BLOOD GLUCOSE: CPT

## 2024-04-17 PROCEDURE — 63710000001 ACETAMINOPHEN 325 MG TABLET: Performed by: UROLOGY

## 2024-04-17 PROCEDURE — 25810000003 SODIUM CHLORIDE 0.9 % SOLUTION 250 ML FLEX CONT: Performed by: UROLOGY

## 2024-04-17 PROCEDURE — 25010000002 CEFOXITIN PER 1 G: Performed by: UROLOGY

## 2024-04-17 PROCEDURE — 63710000001 CETIRIZINE 10 MG TABLET: Performed by: UROLOGY

## 2024-04-17 PROCEDURE — 25010000002 ENOXAPARIN PER 10 MG: Performed by: UROLOGY

## 2024-04-17 PROCEDURE — 63710000001 VERAPAMIL SR 120 MG TABLET CONTROLLED-RELEASE: Performed by: UROLOGY

## 2024-04-17 PROCEDURE — 63710000001 TRIAMTERENE-HYDROCHLOROTHIAZIDE 37.5-25 MG TABLET: Performed by: UROLOGY

## 2024-04-17 PROCEDURE — 63710000001 ATENOLOL 25 MG TABLET: Performed by: UROLOGY

## 2024-04-17 PROCEDURE — 63710000001 FLUOXETINE 20 MG CAPSULE: Performed by: UROLOGY

## 2024-04-17 PROCEDURE — 63710000001 LISINOPRIL 20 MG TABLET: Performed by: UROLOGY

## 2024-04-17 RX ADMIN — FLUOXETINE 40 MG: 20 CAPSULE ORAL at 08:42

## 2024-04-17 RX ADMIN — GABAPENTIN 100 MG: 100 CAPSULE ORAL at 08:43

## 2024-04-17 RX ADMIN — ENOXAPARIN SODIUM 40 MG: 100 INJECTION SUBCUTANEOUS at 08:46

## 2024-04-17 RX ADMIN — ATENOLOL 25 MG: 25 TABLET ORAL at 08:43

## 2024-04-17 RX ADMIN — CETIRIZINE HYDROCHLORIDE 5 MG: 10 TABLET, FILM COATED ORAL at 08:43

## 2024-04-17 RX ADMIN — VERAPAMIL HYDROCHLORIDE 120 MG: 120 TABLET, FILM COATED, EXTENDED RELEASE ORAL at 09:23

## 2024-04-17 RX ADMIN — ACETAMINOPHEN 650 MG: 325 TABLET ORAL at 05:52

## 2024-04-17 RX ADMIN — DORZOLAMIDE HYDROCHLORIDE: 20 SOLUTION/ DROPS OPHTHALMIC at 09:24

## 2024-04-17 RX ADMIN — TRIAMTERENE AND HYDROCHLOROTHIAZIDE 1 TABLET: 37.5; 25 TABLET ORAL at 09:23

## 2024-04-17 RX ADMIN — CEFOXITIN SODIUM 1000 MG: 1 POWDER, FOR SOLUTION INTRAVENOUS at 08:42

## 2024-04-17 RX ADMIN — LISINOPRIL 20 MG: 20 TABLET ORAL at 08:43

## 2024-04-17 RX ADMIN — PANTOPRAZOLE SODIUM 40 MG: 40 TABLET, DELAYED RELEASE ORAL at 05:52

## 2024-04-17 NOTE — PLAN OF CARE
Goal Outcome Evaluation:   Patents VS remain stable throughout the night. She has reported 0-1/10 pain. Resting quietly.

## 2024-04-17 NOTE — DISCHARGE SUMMARY
Patient Name: Norma Fletcher  MRN: [id number]  : 1945  DOS: 2024    Attending: Darci No Jr*    Primary Care Provider: Stephanie Terry DO    Date of Admission:.2024  5:27 AM    Date of Discharge:  2024    Discharge Diagnosis:   Uterovaginal prolapse, s/p robotic lap sacrocolpopexy 24    Controlled type 2 diabetes mellitus without complication, without long-term current use of insulin    Hypertension    Mixed hyperlipidemia    Vaginal prolapse      Hospital Course    At admit:    Patient is a pleasant 78 y.o. female presented for scheduled surgery by Dr. Oneal Garcia.     Per his note (78-year-old female with symptomatic uterovaginal prolapse. She was found to have stress incontinence on urodynamics testing. She opts for surgical therapy understanding risk benefits and alternatives.).     Patient underwent robotic laparoscopic sacrocolpopexy under general anesthesia and a block, tolerated surgery well.  She was admitted for further management.     Seen in her room postop, doing well with good pain control, no complaints of nausea vomiting, or shortness of breath.     Reviewed with patient her past medical history and home medications.    After Admit:    She was provided pain medication as needed for pain control.  Risks and benefits of opiate medications discussed with patient.  Juan Antonio report in chart was reviewed prior to discharge.    She received DVT prophylaxis with subcutaneous Lovenox as well as mechanicals.     She used an IS for atelectasis prophylaxis.    She was able to void spontaneously post catheter removal.     She was provided a bowel regimen and was encouraged to ambulate postop day 1 which she did.    Home medications were resumed as appropriate, and labs were monitored and remained fairly stable.     With the progress she has made, she is ready for [place] home today.      Discussed with patient regarding plan and she shows understanding and agreement.     Procedures  Performed    2024     Pre-op Diagnosis:   Uterovaginal prolapse     Post-op Diagnosis:     Uterovaginal prolapse     Procedure/CPT® Codes:        Procedure(s):  SACROCOLPOPEXY LAPAROSCOPIC WITH DAVINCI ROBOT     Surgeon(s):  Darci No Jr., MD    Pertinent Test Results:    I reviewed the patient's new clinical results.     Results from last 7 days   Lab Units 24  0723   POTASSIUM mmol/L 3.9     I reviewed the patient's new imaging including images and reports.      Discharge Assessment:    Vital Signs  Visit Vitals  BP 97/60 (BP Location: Right arm, Patient Position: Lying)   Pulse 75   Temp 97 °F (36.1 °C) (Temporal)   Resp 16   SpO2 97%     Temp (24hrs), Av.3 °F (36.3 °C), Min:96.2 °F (35.7 °C), Max:98.4 °F (36.9 °C)      General Appearance:    Alert, cooperative, in no acute distress   Lungs:     Clear to auscultation,respirations regular, even and unlabored    Heart:    Regular rhythm and normal rate, normal S1 and  S2   Abdomen:   Soft, benign, clean incisions.   Extremities:   Moves all extremities well, no edema, no cyanosis, no redness   Pulses:   Pulses palpable and equal bilaterally   Skin:   No bleeding, bruising or rash          Discharge Disposition: Home          Discharge Medications        New Medications        Instructions Start Date   docusate sodium 100 MG capsule  Commonly known as: Colace   100 mg, Oral, Daily PRN      doxycycline 100 MG capsule  Commonly known as: VIBRAMYCIN   100 mg, Oral, Daily      HYDROcodone-acetaminophen 7.5-325 MG per tablet  Commonly known as: NORCO   1 tablet, Oral, Every 6 Hours PRN             Changes to Medications        Instructions Start Date   metFORMIN 500 MG tablet  Commonly known as: GLUCOPHAGE  What changed:   how much to take  how to take this  when to take this   TAKE 1 & 1/2 (ONE & ONE-HALF) TABLETS BY MOUTH TWICE DAILY             Continue These Medications        Instructions Start Date   aspirin 81 MG EC tablet   Take 1  tablet by mouth Daily.      b complex-C-E-zinc tablet   1 tablet, Oral, Daily      calcium carbonate 600 MG tablet  Commonly known as: OS-KEYANNA   600 mg, Oral, Daily      dorzolamide-timolol 2-0.5 % ophthalmic solution  Commonly known as: COSOPT   Administer 1 drop to both eyes 2 (Two) Times a Day.      esomeprazole 20 MG capsule  Commonly known as: nexIUM   20 mg, Oral, Every Morning Before Breakfast      ferrous sulfate 324 (65 Fe) MG tablet delayed-release EC tablet   324 mg, Oral, 3 Times Weekly      FLUoxetine 40 MG capsule  Commonly known as: PROzac   40 mg, Oral, Daily      lisinopril 20 MG tablet  Commonly known as: PRINIVIL,ZESTRIL   20 mg, Oral, Daily      loratadine 10 MG tablet  Commonly known as: Claritin   5 mg, Oral, Daily      Magnesium Oxide -Mg Supplement 400 (240 Mg) MG tablet   400 mg, Oral, Daily      pravastatin 40 MG tablet  Commonly known as: PRAVACHOL   40 mg, Oral, Daily      triamterene-hydrochlorothiazide 37.5-25 MG per capsule  Commonly known as: DYAZIDE   1 capsule, Oral, Every Morning      Trimo-Hanley 0.025-0.01 % gel  Generic drug: Oxyquinoline-Sod Lauryl Sulf   Insert 1 dose into the vagina 2 (Two) Times a Week.      verapamil  MG CR tablet  Commonly known as: CALAN-SR   120 mg, Oral, Daily      vitamin C 250 MG tablet  Commonly known as: ASCORBIC ACID   500 mg, Oral, 3 Times Weekly      vitamin D3 125 MCG (5000 UT) capsule capsule   5,000 Units, Oral, Daily      vitamin E 400 UNIT capsule   400 Units, Oral, Daily      Zinc 50 MG tablet   50 mg, Oral, Daily             Stop These Medications      atenolol 25 MG tablet  Commonly known as: Tenormin              Discharge Diet: Soft, bland diet    Activity at Discharge: ambulate    Follow-up Appointments  Dr. Oneal Garcia per his orders       Elana Rodríguez, BRANDON  04/17/24  12:17 EDT

## 2024-04-17 NOTE — PROGRESS NOTES
Patient is doing well today   pain well-controlled    Abdomen is benign  Incisions look good    Stevens catheter and vaginal pack are removed      Home later today

## 2024-04-26 ENCOUNTER — LAB (OUTPATIENT)
Dept: LAB | Facility: HOSPITAL | Age: 79
End: 2024-04-26
Payer: MEDICARE

## 2024-04-26 ENCOUNTER — OFFICE VISIT (OUTPATIENT)
Dept: FAMILY MEDICINE CLINIC | Facility: CLINIC | Age: 79
End: 2024-04-26
Payer: MEDICARE

## 2024-04-26 VITALS
SYSTOLIC BLOOD PRESSURE: 130 MMHG | BODY MASS INDEX: 26.68 KG/M2 | DIASTOLIC BLOOD PRESSURE: 80 MMHG | HEART RATE: 78 BPM | TEMPERATURE: 98.1 F | OXYGEN SATURATION: 97 % | WEIGHT: 145 LBS | HEIGHT: 62 IN

## 2024-04-26 DIAGNOSIS — T78.3XXA ANGIOEDEMA, INITIAL ENCOUNTER: ICD-10-CM

## 2024-04-26 DIAGNOSIS — T78.3XXA ANGIOEDEMA, INITIAL ENCOUNTER: Primary | ICD-10-CM

## 2024-04-26 LAB
C4 SERPL-MCNC: 36 MG/DL (ref 14–44)
CRP SERPL-MCNC: 6.14 MG/DL (ref 0–0.5)
ERYTHROCYTE [SEDIMENTATION RATE] IN BLOOD: 45 MM/HR (ref 0–30)

## 2024-04-26 PROCEDURE — 3079F DIAST BP 80-89 MM HG: CPT | Performed by: STUDENT IN AN ORGANIZED HEALTH CARE EDUCATION/TRAINING PROGRAM

## 2024-04-26 PROCEDURE — 3075F SYST BP GE 130 - 139MM HG: CPT | Performed by: STUDENT IN AN ORGANIZED HEALTH CARE EDUCATION/TRAINING PROGRAM

## 2024-04-26 PROCEDURE — 36415 COLL VENOUS BLD VENIPUNCTURE: CPT

## 2024-04-26 PROCEDURE — 85652 RBC SED RATE AUTOMATED: CPT

## 2024-04-26 PROCEDURE — 99214 OFFICE O/P EST MOD 30 MIN: CPT | Performed by: STUDENT IN AN ORGANIZED HEALTH CARE EDUCATION/TRAINING PROGRAM

## 2024-04-26 PROCEDURE — 86160 COMPLEMENT ANTIGEN: CPT

## 2024-04-26 PROCEDURE — 86140 C-REACTIVE PROTEIN: CPT

## 2024-04-26 RX ORDER — PREDNISONE 20 MG/1
TABLET ORAL
Qty: 8 TABLET | Refills: 0 | Status: SHIPPED | OUTPATIENT
Start: 2024-04-26

## 2024-04-26 RX ORDER — LORATADINE 10 MG/1
10 TABLET ORAL 2 TIMES DAILY
Qty: 14 TABLET | Refills: 0 | Status: SHIPPED | OUTPATIENT
Start: 2024-04-26 | End: 2024-05-03

## 2024-04-26 NOTE — PROGRESS NOTES
Established Patient Office Visit        Subjective      Chief Complaint:  Allergic Reaction (Swollen lips/X 2 days)      History of Present Illness: Norma Fletcher is a 78 y.o. female who presents for lip swelling started yesterday. No trigger. Had sacroplexy procedure on 4/16 and given doxycycline.       Patient Active Problem List   Diagnosis    Acute viral syndrome    Palpitations    Controlled type 2 diabetes mellitus without complication, without long-term current use of insulin    Hypertension    Mixed hyperlipidemia    Situational anxiety    Numbness of right foot    Encounter for subsequent annual wellness visit (AWV) in Medicare patient    Vaginal prolapse    Right shoulder pain    Subacute cough    Uterovaginal prolapse, s/p robotic lap sacrocolpopexy 4/16/24         Current Outpatient Medications:     aspirin 81 MG EC tablet, Take 1 tablet by mouth Daily., Disp: , Rfl:     calcium carbonate (OS-KEYANNA) 600 MG tablet, Take 1 tablet by mouth Daily., Disp: , Rfl:     docusate sodium (Colace) 100 MG capsule, Take 1 capsule by mouth Daily As Needed for Constipation., Disp: 30 capsule, Rfl: 1    dorzolamide-timolol (COSOPT) 22.3-6.8 MG/ML ophthalmic solution, Administer 1 drop to both eyes 2 (Two) Times a Day., Disp: , Rfl:     esomeprazole (nexIUM) 20 MG capsule, Take 1 capsule by mouth Every Morning Before Breakfast., Disp: , Rfl:     ferrous sulfate 324 (65 Fe) MG tablet delayed-release EC tablet, Take 1 tablet by mouth 3 (Three) Times a Week., Disp: , Rfl:     FLUoxetine (PROzac) 40 MG capsule, Take 1 capsule by mouth Daily., Disp: 90 capsule, Rfl: 1    HYDROcodone-acetaminophen (NORCO) 7.5-325 MG per tablet, Take 1 tablet by mouth Every 6 (Six) Hours As Needed for Moderate Pain., Disp: 30 tablet, Rfl: 0    loratadine (Claritin) 10 MG tablet, Take 1 tablet by mouth 2 (Two) Times a Day for 7 days., Disp: 14 tablet, Rfl: 0    Magnesium Oxide -Mg Supplement 400 (240 Mg) MG tablet, Take 1 tablet by mouth Daily.,  "Disp: , Rfl:     metFORMIN (GLUCOPHAGE) 500 MG tablet, TAKE 1 & 1/2 (ONE & ONE-HALF) TABLETS BY MOUTH TWICE DAILY (Patient taking differently: Take 1.5 tablets by mouth 2 (Two) Times a Day With Meals. TAKE 1 & 1/2 (ONE & ONE-HALF) TABLETS BY MOUTH TWICE DAILY), Disp: 270 tablet, Rfl: 1    Multiple Vitamins-Minerals (b complex-C-E-zinc) tablet, Take 1 tablet by mouth Daily., Disp: , Rfl:     pravastatin (PRAVACHOL) 40 MG tablet, Take 1 tablet by mouth Daily., Disp: 90 tablet, Rfl: 1    triamterene-hydrochlorothiazide (DYAZIDE) 37.5-25 MG per capsule, Take 1 capsule by mouth Every Morning., Disp: 90 capsule, Rfl: 1    Trimo-Hanley 0.025-0.01 % gel, Insert 1 dose into the vagina 2 (Two) Times a Week., Disp: , Rfl:     verapamil SR (CALAN-SR) 120 MG CR tablet, Take 1 tablet by mouth Daily., Disp: 90 tablet, Rfl: 1    vitamin C (ASCORBIC ACID) 250 MG tablet, Take 2 tablets by mouth 3 (Three) Times a Week., Disp: , Rfl:     vitamin D3 125 MCG (5000 UT) capsule capsule, Take 1 capsule by mouth Daily., Disp: , Rfl:     VITAMIN E 400 UNIT capsule, Take 1 capsule by mouth Daily., Disp: , Rfl:     Zinc 50 MG tablet, Take 1 tablet by mouth Daily., Disp: , Rfl:     predniSONE (DELTASONE) 20 MG tablet, Day 1-3 take 2 tablets daily. Day 4 and 5 take 1 tablet daily., Disp: 8 tablet, Rfl: 0       Objective     Physical Exam:   Vital Signs:   /80 (BP Location: Left arm, Patient Position: Sitting, Cuff Size: Adult)   Pulse 78   Temp 98.1 °F (36.7 °C) (Temporal)   Ht 157.5 cm (62\")   Wt 65.8 kg (145 lb)   SpO2 97%   BMI 26.52 kg/m²      Physical Exam  Constitutional:       General: She is not in acute distress.     Appearance: She is not ill-appearing.   Cardiovascular:      Rate and Rhythm: Normal rate and regular rhythm.   Pulmonary:      Effort: Pulmonary effort is normal.      Breath sounds: Normal breath sounds.   Oropharynx within normal limits  Significant swelling to the upper and lower lips.           Assessment / " Plan      Assessment/Plan:   Diagnoses and all orders for this visit:    1. Angioedema, initial encounter (Primary)  -     C-reactive protein; Future  -     Sedimentation rate, automated; Future  -     C4 Complement; Future  -     predniSONE (DELTASONE) 20 MG tablet; Day 1-3 take 2 tablets daily. Day 4 and 5 take 1 tablet daily.  Dispense: 8 tablet; Refill: 0  -     loratadine (Claritin) 10 MG tablet; Take 1 tablet by mouth 2 (Two) Times a Day for 7 days.  Dispense: 14 tablet; Refill: 0    Stop lisinopril   Continue other antihypertensives  Probably angioedema secondary to lisinopril.     Monitor pressures at home. Could trial ARB in future if needed. Other option would be amlodipine which would have mild interaction with the verapamil.     Follow Up:   Return in about 3 weeks (around 5/17/2024) for Follow-up.    MDM: Likely side effect of lisinopril.  Rx management    Neil Page MD  Family Medicine - [facility]

## 2024-04-29 DIAGNOSIS — T78.3XXA ANGIOEDEMA, INITIAL ENCOUNTER: Primary | ICD-10-CM

## 2024-05-23 ENCOUNTER — TRANSCRIBE ORDERS (OUTPATIENT)
Dept: ADMINISTRATIVE | Facility: HOSPITAL | Age: 79
End: 2024-05-23
Payer: MEDICARE

## 2024-05-23 DIAGNOSIS — Z12.31 SCREENING MAMMOGRAM FOR BREAST CANCER: Primary | ICD-10-CM

## 2024-05-29 ENCOUNTER — HOSPITAL ENCOUNTER (OUTPATIENT)
Dept: MAMMOGRAPHY | Facility: HOSPITAL | Age: 79
Discharge: HOME | End: 2024-05-29
Admitting: FAMILY MEDICINE
Payer: MEDICARE

## 2024-05-29 DIAGNOSIS — Z12.31 SCREENING MAMMOGRAM FOR BREAST CANCER: ICD-10-CM

## 2024-05-29 PROCEDURE — 77063 BREAST TOMOSYNTHESIS BI: CPT

## 2024-05-29 PROCEDURE — 77067 SCR MAMMO BI INCL CAD: CPT

## 2024-07-01 ENCOUNTER — OFFICE VISIT (OUTPATIENT)
Dept: FAMILY MEDICINE CLINIC | Facility: CLINIC | Age: 79
End: 2024-07-01
Payer: MEDICARE

## 2024-07-01 VITALS
TEMPERATURE: 98.6 F | SYSTOLIC BLOOD PRESSURE: 121 MMHG | BODY MASS INDEX: 27.6 KG/M2 | DIASTOLIC BLOOD PRESSURE: 74 MMHG | WEIGHT: 150 LBS | HEIGHT: 62 IN | HEART RATE: 81 BPM | OXYGEN SATURATION: 96 %

## 2024-07-01 DIAGNOSIS — E78.2 MIXED HYPERLIPIDEMIA: ICD-10-CM

## 2024-07-01 DIAGNOSIS — E55.9 VITAMIN D DEFICIENCY: ICD-10-CM

## 2024-07-01 DIAGNOSIS — Z00.00 ENCOUNTER FOR SUBSEQUENT ANNUAL WELLNESS VISIT (AWV) IN MEDICARE PATIENT: Primary | ICD-10-CM

## 2024-07-01 DIAGNOSIS — E11.9 CONTROLLED TYPE 2 DIABETES MELLITUS WITHOUT COMPLICATION, WITHOUT LONG-TERM CURRENT USE OF INSULIN: ICD-10-CM

## 2024-07-01 DIAGNOSIS — Z78.0 POSTMENOPAUSE: ICD-10-CM

## 2024-07-01 DIAGNOSIS — Z76.0 MEDICATION REFILL: ICD-10-CM

## 2024-07-01 DIAGNOSIS — I10 PRIMARY HYPERTENSION: ICD-10-CM

## 2024-07-01 DIAGNOSIS — F41.8 SITUATIONAL ANXIETY: ICD-10-CM

## 2024-07-01 PROBLEM — B34.9 ACUTE VIRAL SYNDROME: Status: RESOLVED | Noted: 2022-11-30 | Resolved: 2024-07-01

## 2024-07-01 PROBLEM — R05.2 SUBACUTE COUGH: Status: RESOLVED | Noted: 2023-11-07 | Resolved: 2024-07-01

## 2024-07-01 LAB
ALBUMIN/CREATININE RATIO, URINE: <30
EXPIRATION DATE: ABNORMAL
EXPIRATION DATE: NORMAL
HBA1C MFR BLD: 6.1 % (ref 4.5–5.7)
Lab: ABNORMAL
Lab: NORMAL
POC CREATININE URINE: 10
POC MICROALBUMIN URINE: 10

## 2024-07-01 PROCEDURE — 82044 UR ALBUMIN SEMIQUANTITATIVE: CPT | Performed by: FAMILY MEDICINE

## 2024-07-01 PROCEDURE — 3074F SYST BP LT 130 MM HG: CPT | Performed by: FAMILY MEDICINE

## 2024-07-01 PROCEDURE — 83036 HEMOGLOBIN GLYCOSYLATED A1C: CPT | Performed by: FAMILY MEDICINE

## 2024-07-01 PROCEDURE — 3078F DIAST BP <80 MM HG: CPT | Performed by: FAMILY MEDICINE

## 2024-07-01 PROCEDURE — 1126F AMNT PAIN NOTED NONE PRSNT: CPT | Performed by: FAMILY MEDICINE

## 2024-07-01 PROCEDURE — 3044F HG A1C LEVEL LT 7.0%: CPT | Performed by: FAMILY MEDICINE

## 2024-07-01 PROCEDURE — 99214 OFFICE O/P EST MOD 30 MIN: CPT | Performed by: FAMILY MEDICINE

## 2024-07-01 PROCEDURE — 1160F RVW MEDS BY RX/DR IN RCRD: CPT | Performed by: FAMILY MEDICINE

## 2024-07-01 PROCEDURE — G0439 PPPS, SUBSEQ VISIT: HCPCS | Performed by: FAMILY MEDICINE

## 2024-07-01 PROCEDURE — 1159F MED LIST DOCD IN RCRD: CPT | Performed by: FAMILY MEDICINE

## 2024-07-01 PROCEDURE — 1170F FXNL STATUS ASSESSED: CPT | Performed by: FAMILY MEDICINE

## 2024-07-01 RX ORDER — LORATADINE 10 MG/1
10 TABLET ORAL DAILY
Start: 2024-07-01

## 2024-07-01 RX ORDER — TRIAMTERENE AND HYDROCHLOROTHIAZIDE 37.5; 25 MG/1; MG/1
1 CAPSULE ORAL EVERY MORNING
Qty: 90 CAPSULE | Refills: 1 | Status: SHIPPED | OUTPATIENT
Start: 2024-07-01

## 2024-07-01 RX ORDER — FLUOXETINE HYDROCHLORIDE 40 MG/1
40 CAPSULE ORAL DAILY
Qty: 90 CAPSULE | Refills: 1 | Status: SHIPPED | OUTPATIENT
Start: 2024-07-01

## 2024-07-01 RX ORDER — PRAVASTATIN SODIUM 40 MG
40 TABLET ORAL DAILY
Qty: 90 TABLET | Refills: 1 | Status: SHIPPED | OUTPATIENT
Start: 2024-07-01

## 2024-07-01 NOTE — ASSESSMENT & PLAN NOTE
Annual wellness exam completed today. Health Maintenance including immunizations was updated and reflected in the chart. Yearly screening labs were ordered.     Further recommendations to be given once lab data received.     Health advice: healthy food choices with fresh fruits and vegetables, maintain sleep pattern at least 8 hours, avoid texting and distracted driving practices; wear safety belt, engage in regular exercise, maintain healthy weight, use safe sex practices, avoid alcohol and illicit drugs. Maintain immunizations that are up to date. Maintain health maintenance:Colonoscopy DEXA, Mammo, PAP, etc.  Follow up with PCP if struggling with depression or anxiety. Keep regular dental and eye exams. Brush and floss teeth daily.     I suggest they take a daily multivitamin that is age-appropriate (example women's One-A-Day.)  Patient voiced understanding of these instructions.     Follow up Annually for Physical

## 2024-07-01 NOTE — ASSESSMENT & PLAN NOTE
Hypertension is improving with treatment.  Continue current treatment regimen.  Dietary sodium restriction.  Regular aerobic exercise.  Ambulatory blood pressure monitoring.  Blood pressure will be reassessed at the next regular appointment.  Target BP <140/90

## 2024-07-01 NOTE — PROGRESS NOTES
The ABCs of the Annual Wellness Visit  Subsequent Medicare Wellness Visit    Subjective    Norma Fletcher is a 78 y.o. female who presents for a Subsequent Medicare Wellness Visit.    The following portions of the patient's history were reviewed and   updated as appropriate: allergies, current medications, past family history, past medical history, past social history, past surgical history, and problem list.    Compared to one year ago, the patient feels her physical   health is the same.    Compared to one year ago, the patient feels her mental   health is the same.    Recent Hospitalizations:  She was admitted to the hospital during the last year at [facility]       Current Medical Providers:  Patient Care Team:  Stephanie Terry DO as PCP - General (Family Medicine)  Stephanie Velazquez MD as Obstetrician (Obstetrics and Gynecology)  Darci No (Urology)    Outpatient Medications Prior to Visit   Medication Sig Dispense Refill    aspirin 81 MG EC tablet Take 1 tablet by mouth Daily.      calcium carbonate (OS-KEYANNA) 600 MG tablet Take 1 tablet by mouth Daily.      dorzolamide-timolol (COSOPT) 22.3-6.8 MG/ML ophthalmic solution Administer 1 drop to both eyes 2 (Two) Times a Day.      esomeprazole (nexIUM) 20 MG capsule Take 1 capsule by mouth Every Morning Before Breakfast.      ferrous sulfate 324 (65 Fe) MG tablet delayed-release EC tablet Take 1 tablet by mouth 3 (Three) Times a Week.      HYDROcodone-acetaminophen (NORCO) 7.5-325 MG per tablet Take 1 tablet by mouth Every 6 (Six) Hours As Needed for Moderate Pain. 30 tablet 0    Magnesium Oxide -Mg Supplement 400 (240 Mg) MG tablet Take 1 tablet by mouth Daily.      Multiple Vitamins-Minerals (b complex-C-E-zinc) tablet Take 1 tablet by mouth Daily.      vitamin C (ASCORBIC ACID) 250 MG tablet Take 2 tablets by mouth 3 (Three) Times a Week.      vitamin D3 125 MCG (5000 UT) capsule capsule Take 1 capsule by mouth Daily.      VITAMIN E  400 UNIT capsule Take 1 capsule by mouth Daily.      Zinc 50 MG tablet Take 1 tablet by mouth Daily.      FLUoxetine (PROzac) 40 MG capsule Take 1 capsule by mouth Daily. 90 capsule 1    metFORMIN (GLUCOPHAGE) 500 MG tablet TAKE 1 & 1/2 (ONE & ONE-HALF) TABLETS BY MOUTH TWICE DAILY (Patient taking differently: Take 1.5 tablets by mouth 2 (Two) Times a Day With Meals. TAKE 1 & 1/2 (ONE & ONE-HALF) TABLETS BY MOUTH TWICE DAILY) 270 tablet 1    pravastatin (PRAVACHOL) 40 MG tablet Take 1 tablet by mouth Daily. 90 tablet 1    triamterene-hydrochlorothiazide (DYAZIDE) 37.5-25 MG per capsule Take 1 capsule by mouth Every Morning. 90 capsule 1    verapamil SR (CALAN-SR) 120 MG CR tablet Take 1 tablet by mouth Daily. 90 tablet 1    docusate sodium (Colace) 100 MG capsule Take 1 capsule by mouth Daily As Needed for Constipation. (Patient not taking: Reported on 7/1/2024) 30 capsule 1    loratadine (Claritin) 10 MG tablet Take 1 tablet by mouth 2 (Two) Times a Day for 7 days. 14 tablet 0    predniSONE (DELTASONE) 20 MG tablet Day 1-3 take 2 tablets daily. Day 4 and 5 take 1 tablet daily. (Patient not taking: Reported on 7/1/2024) 8 tablet 0    Trimo-Hanley 0.025-0.01 % gel Insert 1 dose into the vagina 2 (Two) Times a Week. (Patient not taking: Reported on 7/1/2024)       No facility-administered medications prior to visit.       Opioid medication/s are on active medication list.  and I have evaluated her active treatment plan and pain score trends (see table).  There were no vitals filed for this visit.  I have reviewed the chart for potential of high risk medication and harmful drug interactions in the elderly.          Aspirin is on active medication list. Aspirin use is indicated based on review of current medical condition/s. Pros and cons of this therapy have been discussed today. Benefits of this medication outweigh potential harm.  Patient has been encouraged to continue taking this medication.  .      Patient Active  "Problem List   Diagnosis    Palpitations    Controlled type 2 diabetes mellitus without complication, without long-term current use of insulin    Hypertension    Mixed hyperlipidemia    Situational anxiety    Numbness of right foot    Encounter for subsequent annual wellness visit (AWV) in Medicare patient    Vaginal prolapse    Right shoulder pain    Uterovaginal prolapse, s/p robotic lap sacrocolpopexy 24     Advance Care Planning   Advance Care Planning     Advance Directive is not on file.  ACP discussion was held with the patient during this visit. Patient has an advance directive (not in EMR), copy requested.     Objective    Vitals:    24 1609   BP: 121/74   Pulse: 81   Temp: 98.6 °F (37 °C)   TempSrc: Infrared   SpO2: 96%   Weight: 68 kg (150 lb)   Height: 157.5 cm (62.01\")     Estimated body mass index is 27.43 kg/m² as calculated from the following:    Height as of this encounter: 157.5 cm (62.01\").    Weight as of this encounter: 68 kg (150 lb).           Does the patient have evidence of cognitive impairment? No    Lab Results   Component Value Date    HGBA1C 6.1 (A) 2024        HEALTH RISK ASSESSMENT    Smoking Status:  Social History     Tobacco Use   Smoking Status Former    Current packs/day: 0.00    Average packs/day: 0.5 packs/day for 7.0 years (3.5 ttl pk-yrs)    Types: Cigarettes    Start date: 1968    Quit date:     Years since quittin.5    Passive exposure: Never   Smokeless Tobacco Never     Alcohol Consumption:  Social History     Substance and Sexual Activity   Alcohol Use Yes    Comment: Maybe  1 glass a month     Fall Risk Screen:    MIN Fall Risk Assessment was completed, and patient is at HIGH risk for falls. Assessment completed on:2024    Depression Screenin/1/2024     4:07 PM   PHQ-2/PHQ-9 Depression Screening   Little Interest or Pleasure in Doing Things 0-->not at all   Feeling Down, Depressed or Hopeless 0-->not at all   PHQ-9: Brief " Depression Severity Measure Score 0       Health Habits and Functional and Cognitive Screenin/1/2024     4:04 PM   Functional & Cognitive Status   Do you have difficulty preparing food and eating? No   Do you have difficulty bathing yourself, getting dressed or grooming yourself? No   Do you have difficulty using the toilet? No   Do you have difficulty moving around from place to place? No   Do you have trouble with steps or getting out of a bed or a chair? No   Current Diet Well Balanced Diet   Dental Exam Up to date   Eye Exam Up to date   Exercise (times per week) 2 times per week   Current Exercises Include Treadmill;House Cleaning   Do you need help using the phone?  No   Are you deaf or do you have serious difficulty hearing?  No   Do you need help to go to places out of walking distance? No   Do you need help shopping? No   Do you need help preparing meals?  No   Do you need help with housework?  No   Do you need help with laundry? No   Do you need help taking your medications? No   Do you need help managing money? No   Do you ever drive or ride in a car without wearing a seat belt? No   Have you felt unusual stress, anger or loneliness in the last month? No   Who do you live with? Spouse   If you need help, do you have trouble finding someone available to you? Yes   Have you been bothered in the last four weeks by sexual problems? No   Do you have difficulty concentrating, remembering or making decisions? No       Age-appropriate Screening Schedule:  Refer to the list below for future screening recommendations based on patient's age, sex and/or medical conditions. Orders for these recommended tests are listed in the plan section. The patient has been provided with a written plan.    Health Maintenance   Topic Date Due    DXA SCAN  2023    COVID-19 Vaccine (2023- season) 2024 (Originally 2023)    INFLUENZA VACCINE  2024    TDAP/TD VACCINES (2 - Td or Tdap) 10/30/2024     LIPID PANEL  11/07/2024    BMI FOLLOWUP  11/07/2024    HEMOGLOBIN A1C  01/01/2025    DIABETIC EYE EXAM  04/01/2025    ANNUAL WELLNESS VISIT  07/01/2025    URINE MICROALBUMIN  07/01/2025    HEPATITIS C SCREENING  Completed    RSV Vaccine - Adults  Completed    Pneumococcal Vaccine 65+  Completed    ZOSTER VACCINE  Completed    COLORECTAL CANCER SCREENING  Discontinued                  CMS Preventative Services Quick Reference  Risk Factors Identified During Encounter  Depression/Dysphoria: Current medication is effective, no change recommended  Fall Risk-High or Moderate: Discussed Fall Prevention in the home  Immunizations Discussed/Encouraged: Influenza  Dental Screening Recommended  Vision Screening Recommended  The above risks/problems have been discussed with the patient.  Pertinent information has been shared with the patient in the After Visit Summary.  An After Visit Summary and PPPS were made available to the patient.    Follow Up:   Next Medicare Wellness visit to be scheduled in 1 year.       Additional E&M Note during same encounter follows:  Patient has multiple medical problems which are significant and separately identifiable that require additional work above and beyond the Medicare Wellness Visit.      Chief Complaint  Medicare Wellness-subsequent    Subjective        HPI  Norma Flecther is also being seen today for follow up on chronic conditions.   She has a past medical history of hypertension, hyperlipidemia, type 2 diabetes, situational anxiety.        For her hypertension she is prescribed atenolol 25 mg daily, lisinopril 20 mg daily, triamterene/hydrochlorothiazide 37.5-25 mg daily and verapamil 120 mg daily.  Her blood pressure is very well controlled in office today at 121/74.     For her diabetes her current prescription is metformin 750 mg BID - HgA1c was 6.1% in office today.        Objective   Vital Signs:  /74   Pulse 81   Temp 98.6 °F (37 °C) (Infrared)   Ht 157.5 cm  "(62.01\")   Wt 68 kg (150 lb)   SpO2 96%   BMI 27.43 kg/m²     Physical Exam  Vitals reviewed.   Constitutional:       Appearance: She is not ill-appearing.   Eyes:      Pupils: Pupils are equal, round, and reactive to light.   Cardiovascular:      Rate and Rhythm: Normal rate.      Pulses: Normal pulses.   Pulmonary:      Effort: Pulmonary effort is normal.      Breath sounds: Normal breath sounds.   Neurological:      General: No focal deficit present.      Mental Status: She is alert. Mental status is at baseline.   Psychiatric:         Mood and Affect: Mood normal.         Behavior: Behavior normal.         Thought Content: Thought content normal.         Judgment: Judgment normal.             Assessment and Plan   Diagnoses and all orders for this visit:    1. Encounter for subsequent annual wellness visit (AWV) in Medicare patient (Primary)  Assessment & Plan:  Annual wellness exam completed today. Health Maintenance including immunizations was updated and reflected in the chart. Yearly screening labs were ordered.     Further recommendations to be given once lab data received.     Health advice: healthy food choices with fresh fruits and vegetables, maintain sleep pattern at least 8 hours, avoid texting and distracted driving practices; wear safety belt, engage in regular exercise, maintain healthy weight, use safe sex practices, avoid alcohol and illicit drugs. Maintain immunizations that are up to date. Maintain health maintenance:Colonoscopy DEXA, Mammo, PAP, etc.  Follow up with PCP if struggling with depression or anxiety. Keep regular dental and eye exams. Brush and floss teeth daily.     I suggest they take a daily multivitamin that is age-appropriate (example women's One-A-Day.)  Patient voiced understanding of these instructions.     Follow up Annually for Physical       Orders:  -     CBC & Differential; Future  -     Comprehensive Metabolic Panel; Future  -     Lipid Panel; Future  -     TSH Rfx " On Abnormal To Free T4; Future  -     Vitamin B12; Future  -     Vitamin D,25-Hydroxy; Future    2. Controlled type 2 diabetes mellitus without complication, without long-term current use of insulin  Assessment & Plan:  Diabetes is improving with lifestyle modifications.   Continue current treatment regimen.  Reminded to bring in blood sugar diary at next visit.  Diabetes will be reassessed in 3 months.  Up to date on health maintenance, continue Metformin only     Orders:  -     POC Glycosylated Hemoglobin (Hb A1C)  -     POC Microalbumin  -     metFORMIN (GLUCOPHAGE) 500 MG tablet; Take 1.5 tablets by mouth 2 (Two) Times a Day With Meals for 90 days. TAKE 1 & 1/2 (ONE & ONE-HALF) TABLETS BY MOUTH TWICE DAILY  Dispense: 270 tablet; Refill: 1    3. Primary hypertension  Assessment & Plan:  Hypertension is improving with treatment.  Continue current treatment regimen.  Dietary sodium restriction.  Regular aerobic exercise.  Ambulatory blood pressure monitoring.  Blood pressure will be reassessed at the next regular appointment.  Target BP <140/90     Orders:  -     triamterene-hydrochlorothiazide (DYAZIDE) 37.5-25 MG per capsule; Take 1 capsule by mouth Every Morning.  Dispense: 90 capsule; Refill: 1  -     verapamil SR (CALAN-SR) 120 MG CR tablet; Take 1 tablet by mouth Daily.  Dispense: 90 tablet; Refill: 1    4. Mixed hyperlipidemia  Assessment & Plan:   Lipid abnormalities are stable    Plan:  Continue same medication/s without change.      Discussed medication dosage, use, side effects, and goals of treatment in detail.    Counseled patient on lifestyle modifications to help control hyperlipidemia.     Patient Treatment Goals:   LDL goal is under 100    Followup in 6 months.    Orders:  -     pravastatin (PRAVACHOL) 40 MG tablet; Take 1 tablet by mouth Daily.  Dispense: 90 tablet; Refill: 1    5. Situational anxiety  Assessment & Plan:  Well controlled with current dose of Prozac. Refill sent     Orders:  -      FLUoxetine (PROzac) 40 MG capsule; Take 1 capsule by mouth Daily.  Dispense: 90 capsule; Refill: 1    6. Postmenopause  -     DEXA Bone Density Axial; Future    7. Medication refill  -     loratadine (Claritin) 10 MG tablet; Take 1 tablet by mouth Daily.    8. Vitamin D deficiency  -     Vitamin D,25-Hydroxy; Future             Follow Up   No follow-ups on file.  Patient was given instructions and counseling regarding her condition or for health maintenance advice. Please see specific information pulled into the AVS if appropriate.       This document has been electronically signed by Stephanie Terry DO   July 1, 2024 16:41 EDT    Dictated Utilizing Dragon Dictation: Part of this note may be an electronic transcription/translation of spoken language to printed text using the Dragon Dictation System.    Stephanie Terry D.O.  [facility]

## 2024-07-15 ENCOUNTER — LAB (OUTPATIENT)
Dept: LAB | Facility: HOSPITAL | Age: 79
End: 2024-07-15
Payer: MEDICARE

## 2024-07-15 DIAGNOSIS — E55.9 VITAMIN D DEFICIENCY: ICD-10-CM

## 2024-07-15 DIAGNOSIS — Z00.00 ENCOUNTER FOR SUBSEQUENT ANNUAL WELLNESS VISIT (AWV) IN MEDICARE PATIENT: ICD-10-CM

## 2024-07-15 LAB
25(OH)D3 SERPL-MCNC: 71.2 NG/ML (ref 30–100)
ALBUMIN SERPL-MCNC: 4.6 G/DL (ref 3.5–5.2)
ALBUMIN/GLOB SERPL: 1.6 G/DL
ALP SERPL-CCNC: 67 U/L (ref 39–117)
ALT SERPL W P-5'-P-CCNC: 17 U/L (ref 1–33)
ANION GAP SERPL CALCULATED.3IONS-SCNC: 13 MMOL/L (ref 5–15)
AST SERPL-CCNC: 24 U/L (ref 1–32)
BASOPHILS # BLD AUTO: 0.03 10*3/MM3 (ref 0–0.2)
BASOPHILS NFR BLD AUTO: 0.6 % (ref 0–1.5)
BILIRUB SERPL-MCNC: 0.2 MG/DL (ref 0–1.2)
BUN SERPL-MCNC: 8 MG/DL (ref 8–23)
BUN/CREAT SERPL: 15.7 (ref 7–25)
CALCIUM SPEC-SCNC: 9.5 MG/DL (ref 8.6–10.5)
CHLORIDE SERPL-SCNC: 103 MMOL/L (ref 98–107)
CHOLEST SERPL-MCNC: 138 MG/DL (ref 0–200)
CO2 SERPL-SCNC: 26 MMOL/L (ref 22–29)
CREAT SERPL-MCNC: 0.51 MG/DL (ref 0.57–1)
DEPRECATED RDW RBC AUTO: 42.8 FL (ref 37–54)
EGFRCR SERPLBLD CKD-EPI 2021: 96.3 ML/MIN/1.73
EOSINOPHIL # BLD AUTO: 0.13 10*3/MM3 (ref 0–0.4)
EOSINOPHIL NFR BLD AUTO: 2.5 % (ref 0.3–6.2)
ERYTHROCYTE [DISTWIDTH] IN BLOOD BY AUTOMATED COUNT: 14.6 % (ref 12.3–15.4)
GLOBULIN UR ELPH-MCNC: 2.8 GM/DL
GLUCOSE SERPL-MCNC: 114 MG/DL (ref 65–99)
HCT VFR BLD AUTO: 41 % (ref 34–46.6)
HDLC SERPL-MCNC: 36 MG/DL (ref 40–60)
HGB BLD-MCNC: 13.5 G/DL (ref 12–15.9)
IMM GRANULOCYTES # BLD AUTO: 0.01 10*3/MM3 (ref 0–0.05)
IMM GRANULOCYTES NFR BLD AUTO: 0.2 % (ref 0–0.5)
LDLC SERPL CALC-MCNC: 83 MG/DL (ref 0–100)
LDLC/HDLC SERPL: 2.28 {RATIO}
LYMPHOCYTES # BLD AUTO: 2.77 10*3/MM3 (ref 0.7–3.1)
LYMPHOCYTES NFR BLD AUTO: 52.5 % (ref 19.6–45.3)
MCH RBC QN AUTO: 27.2 PG (ref 26.6–33)
MCHC RBC AUTO-ENTMCNC: 32.9 G/DL (ref 31.5–35.7)
MCV RBC AUTO: 82.5 FL (ref 79–97)
MONOCYTES # BLD AUTO: 0.37 10*3/MM3 (ref 0.1–0.9)
MONOCYTES NFR BLD AUTO: 7 % (ref 5–12)
NEUTROPHILS NFR BLD AUTO: 1.97 10*3/MM3 (ref 1.7–7)
NEUTROPHILS NFR BLD AUTO: 37.2 % (ref 42.7–76)
NRBC BLD AUTO-RTO: 0 /100 WBC (ref 0–0.2)
PLATELET # BLD AUTO: 348 10*3/MM3 (ref 140–450)
PMV BLD AUTO: 10.6 FL (ref 6–12)
POTASSIUM SERPL-SCNC: 3.6 MMOL/L (ref 3.5–5.2)
PROT SERPL-MCNC: 7.4 G/DL (ref 6–8.5)
RBC # BLD AUTO: 4.97 10*6/MM3 (ref 3.77–5.28)
SODIUM SERPL-SCNC: 142 MMOL/L (ref 136–145)
TRIGL SERPL-MCNC: 100 MG/DL (ref 0–150)
TSH SERPL DL<=0.05 MIU/L-ACNC: 1.82 UIU/ML (ref 0.27–4.2)
VIT B12 BLD-MCNC: 373 PG/ML (ref 211–946)
VLDLC SERPL-MCNC: 19 MG/DL (ref 5–40)
WBC NRBC COR # BLD AUTO: 5.28 10*3/MM3 (ref 3.4–10.8)

## 2024-07-15 PROCEDURE — 80061 LIPID PANEL: CPT

## 2024-07-15 PROCEDURE — 82607 VITAMIN B-12: CPT

## 2024-07-15 PROCEDURE — 85025 COMPLETE CBC W/AUTO DIFF WBC: CPT

## 2024-07-15 PROCEDURE — 80053 COMPREHEN METABOLIC PANEL: CPT

## 2024-07-15 PROCEDURE — 82306 VITAMIN D 25 HYDROXY: CPT

## 2024-07-15 PROCEDURE — 84443 ASSAY THYROID STIM HORMONE: CPT

## 2024-08-05 ENCOUNTER — HOSPITAL ENCOUNTER (OUTPATIENT)
Dept: BONE DENSITY | Facility: HOSPITAL | Age: 79
Discharge: HOME | End: 2024-08-05
Admitting: FAMILY MEDICINE
Payer: MEDICARE

## 2024-08-05 DIAGNOSIS — Z78.0 POSTMENOPAUSE: ICD-10-CM

## 2024-08-05 PROCEDURE — 77080 DXA BONE DENSITY AXIAL: CPT

## 2025-01-14 ENCOUNTER — OFFICE VISIT (OUTPATIENT)
Dept: FAMILY MEDICINE CLINIC | Facility: CLINIC | Age: 80
End: 2025-01-14
Payer: MEDICARE

## 2025-01-14 VITALS
HEART RATE: 78 BPM | WEIGHT: 152.6 LBS | BODY MASS INDEX: 27.9 KG/M2 | DIASTOLIC BLOOD PRESSURE: 72 MMHG | SYSTOLIC BLOOD PRESSURE: 130 MMHG | OXYGEN SATURATION: 96 % | TEMPERATURE: 98.2 F

## 2025-01-14 DIAGNOSIS — I10 PRIMARY HYPERTENSION: ICD-10-CM

## 2025-01-14 DIAGNOSIS — F41.8 SITUATIONAL ANXIETY: ICD-10-CM

## 2025-01-14 DIAGNOSIS — E11.9 CONTROLLED TYPE 2 DIABETES MELLITUS WITHOUT COMPLICATION, WITHOUT LONG-TERM CURRENT USE OF INSULIN: Primary | ICD-10-CM

## 2025-01-14 DIAGNOSIS — E78.2 MIXED HYPERLIPIDEMIA: ICD-10-CM

## 2025-01-14 DIAGNOSIS — M79.672 LEFT FOOT PAIN: ICD-10-CM

## 2025-01-14 LAB
EXPIRATION DATE: ABNORMAL
HBA1C MFR BLD: 6.7 % (ref 4.5–5.7)
Lab: ABNORMAL

## 2025-01-14 PROCEDURE — 3044F HG A1C LEVEL LT 7.0%: CPT | Performed by: FAMILY MEDICINE

## 2025-01-14 PROCEDURE — 1126F AMNT PAIN NOTED NONE PRSNT: CPT | Performed by: FAMILY MEDICINE

## 2025-01-14 PROCEDURE — 83036 HEMOGLOBIN GLYCOSYLATED A1C: CPT | Performed by: FAMILY MEDICINE

## 2025-01-14 PROCEDURE — 3075F SYST BP GE 130 - 139MM HG: CPT | Performed by: FAMILY MEDICINE

## 2025-01-14 PROCEDURE — 99214 OFFICE O/P EST MOD 30 MIN: CPT | Performed by: FAMILY MEDICINE

## 2025-01-14 PROCEDURE — 3078F DIAST BP <80 MM HG: CPT | Performed by: FAMILY MEDICINE

## 2025-01-14 RX ORDER — MELOXICAM 7.5 MG/1
7.5 TABLET ORAL DAILY
Qty: 90 TABLET | Refills: 1 | Status: SHIPPED | OUTPATIENT
Start: 2025-01-14

## 2025-01-14 RX ORDER — VERAPAMIL HYDROCHLORIDE 120 MG/1
120 TABLET, FILM COATED, EXTENDED RELEASE ORAL DAILY
Qty: 90 TABLET | Refills: 1 | Status: SHIPPED | OUTPATIENT
Start: 2025-01-14

## 2025-01-14 RX ORDER — PRAVASTATIN SODIUM 40 MG
40 TABLET ORAL DAILY
Qty: 90 TABLET | Refills: 1 | Status: SHIPPED | OUTPATIENT
Start: 2025-01-14

## 2025-01-14 RX ORDER — FLUOXETINE 40 MG/1
40 CAPSULE ORAL DAILY
Qty: 90 CAPSULE | Refills: 1 | Status: SHIPPED | OUTPATIENT
Start: 2025-01-14

## 2025-01-14 RX ORDER — TRIAMTERENE AND HYDROCHLOROTHIAZIDE 37.5; 25 MG/1; MG/1
1 CAPSULE ORAL EVERY MORNING
Qty: 90 CAPSULE | Refills: 1 | Status: SHIPPED | OUTPATIENT
Start: 2025-01-14

## 2025-01-14 NOTE — PROGRESS NOTES
Subjective     Chief Complaint  Follow-up (Top of left foot hurts. )    Subjective          Norma Fletcher is a 79 y.o. female who presents today to [facility] for follow up.    HPI:   History of Present Illness    Ms. Fletcher is a pleasant 79 year old female who presents today to follow up on chronic conditions.       She has a past medical history of hypertension, hyperlipidemia, type 2 diabetes, situational anxiety.        For her hypertension she is prescribed atenolol 25 mg daily, lisinopril 20 mg daily, triamterene/hydrochlorothiazide 37.5-25 mg daily and verapamil 120 mg daily.  Her blood pressure is very well controlled in office today at 130/72.     For her diabetes her current prescription is metformin 750 mg BID - HgA1c was 6.7% in office today.     She reports having some left foot pain.  It is over the dorsum of the foot.  It occurs intermittently.  Hurts worse if she is walking.  She does have a history of diabetes as above.  She has not followed with nutritionist.  She has not had imaging of the foot.  She reports been going on for couple of months but has been worsening.    The following portions of the patient's history were reviewed and updated as appropriate: allergies, current medications, past family history, past medical history, past social history, past surgical history and problem list.    Objective     Objective     Allergy:   Allergies   Allergen Reactions    Lisinopril Angioedema     Possible angioedema from lisinopril         Current Medications:   Current Outpatient Medications   Medication Sig Dispense Refill    aspirin 81 MG EC tablet Take 1 tablet by mouth Daily.      calcium carbonate (OS-KEYANNA) 600 MG tablet Take 1 tablet by mouth Daily.      dorzolamide-timolol (COSOPT) 22.3-6.8 MG/ML ophthalmic solution Administer 1 drop to both eyes 2 (Two) Times a Day.      esomeprazole (nexIUM) 20 MG capsule Take 1 capsule by mouth Every Morning Before  Breakfast.      ferrous sulfate 324 (65 Fe) MG tablet delayed-release EC tablet Take 1 tablet by mouth 3 (Three) Times a Week.      FLUoxetine (PROzac) 40 MG capsule Take 1 capsule by mouth Daily. 90 capsule 1    HYDROcodone-acetaminophen (NORCO) 7.5-325 MG per tablet Take 1 tablet by mouth Every 6 (Six) Hours As Needed for Moderate Pain. 30 tablet 0    loratadine (Claritin) 10 MG tablet Take 1 tablet by mouth Daily.      Magnesium Oxide -Mg Supplement 400 (240 Mg) MG tablet Take 1 tablet by mouth Daily.      metFORMIN (GLUCOPHAGE) 500 MG tablet Take 1.5 tablets by mouth 2 (Two) Times a Day With Meals for 180 days. TAKE 1 & 1/2 (ONE & ONE-HALF) TABLETS BY MOUTH TWICE DAILY 270 tablet 1    Multiple Vitamins-Minerals (b complex-C-E-zinc) tablet Take 1 tablet by mouth Daily.      pravastatin (PRAVACHOL) 40 MG tablet Take 1 tablet by mouth Daily. 90 tablet 1    triamterene-hydrochlorothiazide (DYAZIDE) 37.5-25 MG per capsule Take 1 capsule by mouth Every Morning. 90 capsule 1    verapamil SR (CALAN-SR) 120 MG CR tablet Take 1 tablet by mouth Daily. 90 tablet 1    vitamin C (ASCORBIC ACID) 250 MG tablet Take 2 tablets by mouth 3 (Three) Times a Week.      vitamin D3 125 MCG (5000 UT) capsule capsule Take 1 capsule by mouth Daily.      VITAMIN E 400 UNIT capsule Take 1 capsule by mouth Daily.      Zinc 50 MG tablet Take 1 tablet by mouth Daily.      meloxicam (Mobic) 7.5 MG tablet Take 1 tablet by mouth Daily. 90 tablet 1     No current facility-administered medications for this visit.       Past Medical History:  Past Medical History:   Diagnosis Date    Colon polyp     Dyslipidemia     Dysphagia     Recurrent dysphagia secondary to Schatzki's ring; EGD 7/30/2014, 12/9/2015, Dr. Nicholson    Essential (primary) hypertension     Fibrocystic breast changes, bilateral     Fibrocystic breast disease     status post stereotactic biopsy 9/15/09    GERD (gastroesophageal reflux disease)     Glaucoma     Hepatitis     A, B, and  C, status post GI consult with no evidence of active disease    Hypertension     Iron deficiency     Mixed hyperlipidemia     Rash and other nonspecific skin eruption     Synovial cyst of popliteal space (Baker), left knee     Type 2 diabetes mellitus without complications     Unilateral primary osteoarthritis, left knee     Visual impairment     Wears dentures     full upp3er and partial lower    Wears eyeglasses        Past Surgical History:  Past Surgical History:   Procedure Laterality Date    APPENDECTOMY      BREAST BIOPSY      BREAST SURGERY Bilateral     bialteral breast  biopsy benign    CATARACT EXTRACTION, BILATERAL  2022    COLONOSCOPY      multiple all negative, most recently 7/15/2020 completely normal, Dr. Nicholson, recommendation for five-year followup in the year     SACROCOLPOPEXY N/A 2024    Procedure: SACROCOLPOPEXY LAPAROSCOPIC WITH DAVINCI ROBOT;  Surgeon: Darci No Jr., MD;  Location: [facility];  Service: Robotics - DaVinci;  Laterality: N/A;    TUBAL ABDOMINAL LIGATION         Social History:  Social History     Socioeconomic History    Marital status:    Tobacco Use    Smoking status: Former     Current packs/day: 0.00     Average packs/day: 0.5 packs/day for 7.0 years (3.5 ttl pk-yrs)     Types: Cigarettes     Start date: 1968     Quit date:      Years since quittin.0     Passive exposure: Never    Smokeless tobacco: Never   Vaping Use    Vaping status: Never Used   Substance and Sexual Activity    Alcohol use: Yes     Comment: Maybe  1 glass a month    Drug use: Never    Sexual activity: Not Currently     Partners: Male     Birth control/protection: Pill       Family History:  Family History   Problem Relation Age of Onset    Arthritis Mother     Hyperlipidemia Mother         Mother    Breast cancer Sister     Cancer Sister     COPD Brother     Colon cancer Other     Ovarian cancer Neg Hx        Vital Signs:   /72   Pulse 78   Temp 98.2  °F (36.8 °C) (Temporal)   Wt 69.2 kg (152 lb 9.6 oz)   SpO2 96%   BMI 27.90 kg/m²      Physical Exam:  Physical Exam  Vitals reviewed.   Constitutional:       Appearance: She is not ill-appearing.   Eyes:      Pupils: Pupils are equal, round, and reactive to light.   Cardiovascular:      Rate and Rhythm: Normal rate.      Pulses: Normal pulses.   Pulmonary:      Effort: Pulmonary effort is normal.      Breath sounds: Normal breath sounds.   Musculoskeletal:      Left foot: Decreased range of motion. No deformity, bunion, Charcot foot or prominent metatarsal heads.   Neurological:      General: No focal deficit present.      Mental Status: She is alert. Mental status is at baseline.   Psychiatric:         Mood and Affect: Mood normal.         Behavior: Behavior normal.         Thought Content: Thought content normal.         Judgment: Judgment normal.               PHQ-9 Score  PHQ-9 Total Score:      Lab Review  Office Visit on 01/14/2025   Component Date Value Ref Range Status    Hemoglobin A1C 01/14/2025 6.7 (A)  4.5 - 5.7 % Final    Lot Number 01/14/2025 [id number]   Final    Expiration Date 01/14/2025 10/11/2026   Final        Radiology Results        Assessment / Plan         Assessment and Plan   Diagnoses and all orders for this visit:    1. Controlled type 2 diabetes mellitus without complication, without long-term current use of insulin (Primary)  Assessment & Plan:  Diabetes is improving with lifestyle modifications.   Continue current treatment regimen.  Reminded to bring in blood sugar diary at next visit.  Diabetes will be reassessed in 3 months.  Up to date on health maintenance, continue Metformin only     Orders:  -     POC Glycosylated Hemoglobin (Hb A1C)  -     Ambulatory Referral to Podiatry  -     metFORMIN (GLUCOPHAGE) 500 MG tablet; Take 1.5 tablets by mouth 2 (Two) Times a Day With Meals for 180 days. TAKE 1 & 1/2 (ONE & ONE-HALF) TABLETS BY MOUTH TWICE DAILY  Dispense: 270 tablet; Refill:  1    2. Primary hypertension  Assessment & Plan:  Hypertension is improving with treatment.  Continue current treatment regimen.  Dietary sodium restriction.  Regular aerobic exercise.  Ambulatory blood pressure monitoring.  Blood pressure will be reassessed at the next regular appointment.  Target BP <140/90     Orders:  -     verapamil SR (CALAN-SR) 120 MG CR tablet; Take 1 tablet by mouth Daily.  Dispense: 90 tablet; Refill: 1  -     triamterene-hydrochlorothiazide (DYAZIDE) 37.5-25 MG per capsule; Take 1 capsule by mouth Every Morning.  Dispense: 90 capsule; Refill: 1    3. Mixed hyperlipidemia  Assessment & Plan:   Lipid abnormalities are stable    Plan:  Continue same medication/s without change.      Discussed medication dosage, use, side effects, and goals of treatment in detail.    Counseled patient on lifestyle modifications to help control hyperlipidemia.     Patient Treatment Goals:   LDL goal is under 100    Followup in 6 months.    Orders:  -     pravastatin (PRAVACHOL) 40 MG tablet; Take 1 tablet by mouth Daily.  Dispense: 90 tablet; Refill: 1    4. Left foot pain  Assessment & Plan:  Likely osteoarthritis.  Starting Mobic 7.5 mg daily as needed.  Referring to podiatry as well.    Orders:  -     Ambulatory Referral to Podiatry  -     meloxicam (Mobic) 7.5 MG tablet; Take 1 tablet by mouth Daily.  Dispense: 90 tablet; Refill: 1    5. Situational anxiety  Assessment & Plan:  Well controlled with current dose of Prozac. Refill sent     Orders:  -     FLUoxetine (PROzac) 40 MG capsule; Take 1 capsule by mouth Daily.  Dispense: 90 capsule; Refill: 1    6. Medication refill        Discussed possible differential diagnoses, testing, treatment, recommended non-pharmacological interventions, risks, warning signs to monitor for that would indicate need for follow-up in clinic or ER. If no improvement with these regimens or you have new or worsening symptoms follow-up. Patient verbalizes understanding and  agreement with plan of care. Denies further needs or concerns.     Patient was given instructions and counseling regarding her condition and for health maintenance advised.    BMI is >= 25 and <30. (Overweight) The following options were offered after discussion;: weight loss educational material (shared in after visit summary)                  Health Maintenance  Health Maintenance:   Health Maintenance Due   Topic Date Due    DIABETIC FOOT EXAM  Never done    INFLUENZA VACCINE  07/01/2024    COVID-19 Vaccine (6 - 2024-25 season) 09/01/2024    TDAP/TD VACCINES (2 - Td or Tdap) 10/30/2024        Meds ordered during this visit  New Medications Ordered This Visit   Medications    metFORMIN (GLUCOPHAGE) 500 MG tablet     Sig: Take 1.5 tablets by mouth 2 (Two) Times a Day With Meals for 180 days. TAKE 1 & 1/2 (ONE & ONE-HALF) TABLETS BY MOUTH TWICE DAILY     Dispense:  270 tablet     Refill:  1    FLUoxetine (PROzac) 40 MG capsule     Sig: Take 1 capsule by mouth Daily.     Dispense:  90 capsule     Refill:  1    pravastatin (PRAVACHOL) 40 MG tablet     Sig: Take 1 tablet by mouth Daily.     Dispense:  90 tablet     Refill:  1    verapamil SR (CALAN-SR) 120 MG CR tablet     Sig: Take 1 tablet by mouth Daily.     Dispense:  90 tablet     Refill:  1    triamterene-hydrochlorothiazide (DYAZIDE) 37.5-25 MG per capsule     Sig: Take 1 capsule by mouth Every Morning.     Dispense:  90 capsule     Refill:  1    meloxicam (Mobic) 7.5 MG tablet     Sig: Take 1 tablet by mouth Daily.     Dispense:  90 tablet     Refill:  1       Meds stopped during this visit:  Medications Discontinued During This Encounter   Medication Reason    metFORMIN (GLUCOPHAGE) 500 MG tablet Reorder    FLUoxetine (PROzac) 40 MG capsule Reorder    pravastatin (PRAVACHOL) 40 MG tablet Reorder    triamterene-hydrochlorothiazide (DYAZIDE) 37.5-25 MG per capsule Reorder    verapamil SR (CALAN-SR) 120 MG CR tablet Reorder        Visit Diagnoses    ICD-10-CM  ICD-9-CM   1. Controlled type 2 diabetes mellitus without complication, without long-term current use of insulin  E11.9 250.00   2. Primary hypertension  I10 401.9   3. Mixed hyperlipidemia  E78.2 272.2   4. Left foot pain  M79.672 729.5   5. Situational anxiety  F41.8 300.09   6. Medication refill  Z76.0 V68.1       Patient was given instructions and counseling regarding her condition or for health maintenance advice. Please see specific information pulled into the AVS if appropriate.     Follow Up   Return in about 6 months (around 7/14/2025) for followup, Medicare Wellness.          This document has been electronically signed by Stephanie Terry DO   January 14, 2025 16:00 EST    Dictated Utilizing Dragon Dictation: Part of this note may be an electronic transcription/translation of spoken language to printed text using the Dragon Dictation System.    Stephanie Terry D.O.  [facility]

## 2025-05-02 ENCOUNTER — TRANSCRIBE ORDERS (OUTPATIENT)
Dept: FAMILY MEDICINE CLINIC | Facility: CLINIC | Age: 80
End: 2025-05-02
Payer: MEDICARE

## 2025-05-02 DIAGNOSIS — Z12.31 VISIT FOR SCREENING MAMMOGRAM: Primary | ICD-10-CM

## 2025-06-05 ENCOUNTER — HOSPITAL ENCOUNTER (OUTPATIENT)
Dept: MAMMOGRAPHY | Facility: HOSPITAL | Age: 80
Discharge: HOME | End: 2025-06-05
Admitting: FAMILY MEDICINE
Payer: MEDICARE

## 2025-06-05 DIAGNOSIS — Z12.31 VISIT FOR SCREENING MAMMOGRAM: ICD-10-CM

## 2025-06-05 PROCEDURE — 77067 SCR MAMMO BI INCL CAD: CPT

## 2025-06-05 PROCEDURE — 77063 BREAST TOMOSYNTHESIS BI: CPT

## 2025-06-27 ENCOUNTER — TELEPHONE (OUTPATIENT)
Dept: FAMILY MEDICINE CLINIC | Facility: CLINIC | Age: 80
End: 2025-06-27
Payer: MEDICARE

## 2025-06-27 DIAGNOSIS — F41.8 SITUATIONAL ANXIETY: ICD-10-CM

## 2025-06-27 DIAGNOSIS — E78.2 MIXED HYPERLIPIDEMIA: ICD-10-CM

## 2025-06-27 DIAGNOSIS — Z76.0 MEDICATION REFILL: ICD-10-CM

## 2025-06-27 DIAGNOSIS — E11.9 CONTROLLED TYPE 2 DIABETES MELLITUS WITHOUT COMPLICATION, WITHOUT LONG-TERM CURRENT USE OF INSULIN: ICD-10-CM

## 2025-06-27 DIAGNOSIS — I10 PRIMARY HYPERTENSION: ICD-10-CM

## 2025-06-27 DIAGNOSIS — M79.672 LEFT FOOT PAIN: ICD-10-CM

## 2025-06-27 RX ORDER — MELOXICAM 7.5 MG/1
7.5 TABLET ORAL DAILY
Qty: 90 TABLET | Refills: 0 | Status: SHIPPED | OUTPATIENT
Start: 2025-06-27

## 2025-06-27 RX ORDER — FLUOXETINE HYDROCHLORIDE 40 MG/1
40 CAPSULE ORAL DAILY
Qty: 90 CAPSULE | Refills: 0 | Status: SHIPPED | OUTPATIENT
Start: 2025-06-27

## 2025-06-27 RX ORDER — LORATADINE 10 MG/1
10 TABLET ORAL DAILY
Qty: 90 TABLET | Refills: 0 | Status: SHIPPED | OUTPATIENT
Start: 2025-06-27

## 2025-06-27 RX ORDER — VERAPAMIL HYDROCHLORIDE 120 MG/1
120 TABLET, FILM COATED, EXTENDED RELEASE ORAL DAILY
Qty: 90 TABLET | Refills: 0 | Status: SHIPPED | OUTPATIENT
Start: 2025-06-27

## 2025-06-27 RX ORDER — TRIAMTERENE AND HYDROCHLOROTHIAZIDE 37.5; 25 MG/1; MG/1
1 CAPSULE ORAL EVERY MORNING
Qty: 90 CAPSULE | Refills: 0 | Status: SHIPPED | OUTPATIENT
Start: 2025-06-27

## 2025-06-27 RX ORDER — PRAVASTATIN SODIUM 40 MG
40 TABLET ORAL DAILY
Qty: 90 TABLET | Refills: 0 | Status: SHIPPED | OUTPATIENT
Start: 2025-06-27

## 2025-06-27 NOTE — TELEPHONE ENCOUNTER
Provider:  MALIKA DAMRON    Caller: Norma Fletcher    Relationship to Patient: Self    Pharmacy: [facility]. - [facility] [address] [place] [address] [phone] [facility] [phone] FX    Phone Number: [phone]    Reason for Call: PATIENT IS CALLING TO REQUEST TO HAVE ALL HER SCRIPTS SENT TO THIS MAIL ORDER PHARMACY .    PLEASE NOTIFY THE PATIENT ONCE THIS HAS BEEN COMPLETED.

## 2025-07-14 ENCOUNTER — LAB (OUTPATIENT)
Dept: LAB | Facility: HOSPITAL | Age: 80
End: 2025-07-14
Payer: MEDICARE

## 2025-07-14 ENCOUNTER — OFFICE VISIT (OUTPATIENT)
Dept: FAMILY MEDICINE CLINIC | Facility: CLINIC | Age: 80
End: 2025-07-14
Payer: MEDICARE

## 2025-07-14 VITALS
HEART RATE: 76 BPM | HEIGHT: 62 IN | WEIGHT: 153.6 LBS | RESPIRATION RATE: 20 BRPM | OXYGEN SATURATION: 96 % | DIASTOLIC BLOOD PRESSURE: 60 MMHG | TEMPERATURE: 98.6 F | BODY MASS INDEX: 28.26 KG/M2 | SYSTOLIC BLOOD PRESSURE: 110 MMHG

## 2025-07-14 DIAGNOSIS — E78.2 MIXED HYPERLIPIDEMIA: ICD-10-CM

## 2025-07-14 DIAGNOSIS — I10 PRIMARY HYPERTENSION: ICD-10-CM

## 2025-07-14 DIAGNOSIS — E55.9 VITAMIN D DEFICIENCY: ICD-10-CM

## 2025-07-14 DIAGNOSIS — F41.8 SITUATIONAL ANXIETY: ICD-10-CM

## 2025-07-14 DIAGNOSIS — E11.9 CONTROLLED TYPE 2 DIABETES MELLITUS WITHOUT COMPLICATION, WITHOUT LONG-TERM CURRENT USE OF INSULIN: ICD-10-CM

## 2025-07-14 DIAGNOSIS — Z00.00 ENCOUNTER FOR SUBSEQUENT ANNUAL WELLNESS VISIT (AWV) IN MEDICARE PATIENT: Primary | ICD-10-CM

## 2025-07-14 DIAGNOSIS — Z00.00 ENCOUNTER FOR SUBSEQUENT ANNUAL WELLNESS VISIT (AWV) IN MEDICARE PATIENT: ICD-10-CM

## 2025-07-14 DIAGNOSIS — M79.672 LEFT FOOT PAIN: ICD-10-CM

## 2025-07-14 LAB
ALBUMIN SERPL-MCNC: 4.3 G/DL (ref 3.5–5.2)
ALBUMIN/GLOB SERPL: 1.5 G/DL
ALP SERPL-CCNC: 75 U/L (ref 39–117)
ALT SERPL W P-5'-P-CCNC: 22 U/L (ref 1–33)
ANION GAP SERPL CALCULATED.3IONS-SCNC: 15 MMOL/L (ref 5–15)
AST SERPL-CCNC: 30 U/L (ref 1–32)
BASOPHILS # BLD AUTO: 0.04 10*3/MM3 (ref 0–0.2)
BASOPHILS NFR BLD AUTO: 0.6 % (ref 0–1.5)
BILIRUB SERPL-MCNC: 0.2 MG/DL (ref 0–1.2)
BUN SERPL-MCNC: 7 MG/DL (ref 8–23)
BUN/CREAT SERPL: 12.3 (ref 7–25)
CALCIUM SPEC-SCNC: 9.3 MG/DL (ref 8.6–10.5)
CHLORIDE SERPL-SCNC: 101 MMOL/L (ref 98–107)
CHOLEST SERPL-MCNC: 135 MG/DL (ref 0–200)
CO2 SERPL-SCNC: 25 MMOL/L (ref 22–29)
CREAT SERPL-MCNC: 0.57 MG/DL (ref 0.57–1)
DEPRECATED RDW RBC AUTO: 45.7 FL (ref 37–54)
EGFRCR SERPLBLD CKD-EPI 2021: 93.2 ML/MIN/1.73
EOSINOPHIL # BLD AUTO: 0.16 10*3/MM3 (ref 0–0.4)
EOSINOPHIL NFR BLD AUTO: 2.6 % (ref 0.3–6.2)
ERYTHROCYTE [DISTWIDTH] IN BLOOD BY AUTOMATED COUNT: 14.6 % (ref 12.3–15.4)
EXPIRATION DATE: ABNORMAL
EXPIRATION DATE: NORMAL
GLOBULIN UR ELPH-MCNC: 2.9 GM/DL
GLUCOSE SERPL-MCNC: 134 MG/DL (ref 65–99)
HBA1C MFR BLD: 6.2 % (ref 4.5–5.7)
HCT VFR BLD AUTO: 40.4 % (ref 34–46.6)
HDLC SERPL-MCNC: 31 MG/DL (ref 40–60)
HGB BLD-MCNC: 13.2 G/DL (ref 12–15.9)
IMM GRANULOCYTES # BLD AUTO: 0.02 10*3/MM3 (ref 0–0.05)
IMM GRANULOCYTES NFR BLD AUTO: 0.3 % (ref 0–0.5)
LDLC SERPL CALC-MCNC: 62 MG/DL (ref 0–100)
LDLC/HDLC SERPL: 1.68 {RATIO}
LYMPHOCYTES # BLD AUTO: 3.06 10*3/MM3 (ref 0.7–3.1)
LYMPHOCYTES NFR BLD AUTO: 49.1 % (ref 19.6–45.3)
Lab: ABNORMAL
Lab: NORMAL
MCH RBC QN AUTO: 27.9 PG (ref 26.6–33)
MCHC RBC AUTO-ENTMCNC: 32.7 G/DL (ref 31.5–35.7)
MCV RBC AUTO: 85.4 FL (ref 79–97)
MONOCYTES # BLD AUTO: 0.45 10*3/MM3 (ref 0.1–0.9)
MONOCYTES NFR BLD AUTO: 7.2 % (ref 5–12)
NEUTROPHILS NFR BLD AUTO: 2.5 10*3/MM3 (ref 1.7–7)
NEUTROPHILS NFR BLD AUTO: 40.2 % (ref 42.7–76)
NRBC BLD AUTO-RTO: 0 /100 WBC (ref 0–0.2)
PLATELET # BLD AUTO: 299 10*3/MM3 (ref 140–450)
PMV BLD AUTO: 10.7 FL (ref 6–12)
POC ALBUMIN, URINE: 10 MG/L
POC CREATININE, URINE: 10 MG/DL
POC URINE ALB/CREA RATIO: <30
POTASSIUM SERPL-SCNC: 3.7 MMOL/L (ref 3.5–5.2)
PROT SERPL-MCNC: 7.2 G/DL (ref 6–8.5)
RBC # BLD AUTO: 4.73 10*6/MM3 (ref 3.77–5.28)
SODIUM SERPL-SCNC: 141 MMOL/L (ref 136–145)
TRIGL SERPL-MCNC: 259 MG/DL (ref 0–150)
TSH SERPL DL<=0.05 MIU/L-ACNC: 2.56 UIU/ML (ref 0.27–4.2)
VLDLC SERPL-MCNC: 42 MG/DL (ref 5–40)
WBC NRBC COR # BLD AUTO: 6.23 10*3/MM3 (ref 3.4–10.8)

## 2025-07-14 PROCEDURE — 84443 ASSAY THYROID STIM HORMONE: CPT

## 2025-07-14 PROCEDURE — 82044 UR ALBUMIN SEMIQUANTITATIVE: CPT | Performed by: FAMILY MEDICINE

## 2025-07-14 PROCEDURE — 1126F AMNT PAIN NOTED NONE PRSNT: CPT | Performed by: FAMILY MEDICINE

## 2025-07-14 PROCEDURE — 99214 OFFICE O/P EST MOD 30 MIN: CPT | Performed by: FAMILY MEDICINE

## 2025-07-14 PROCEDURE — 82306 VITAMIN D 25 HYDROXY: CPT

## 2025-07-14 PROCEDURE — 83036 HEMOGLOBIN GLYCOSYLATED A1C: CPT | Performed by: FAMILY MEDICINE

## 2025-07-14 PROCEDURE — 1160F RVW MEDS BY RX/DR IN RCRD: CPT | Performed by: FAMILY MEDICINE

## 2025-07-14 PROCEDURE — 82570 ASSAY OF URINE CREATININE: CPT | Performed by: FAMILY MEDICINE

## 2025-07-14 PROCEDURE — G0439 PPPS, SUBSEQ VISIT: HCPCS | Performed by: FAMILY MEDICINE

## 2025-07-14 PROCEDURE — 80061 LIPID PANEL: CPT

## 2025-07-14 PROCEDURE — 1159F MED LIST DOCD IN RCRD: CPT | Performed by: FAMILY MEDICINE

## 2025-07-14 PROCEDURE — 82607 VITAMIN B-12: CPT

## 2025-07-14 PROCEDURE — 85025 COMPLETE CBC W/AUTO DIFF WBC: CPT

## 2025-07-14 PROCEDURE — 3044F HG A1C LEVEL LT 7.0%: CPT | Performed by: FAMILY MEDICINE

## 2025-07-14 PROCEDURE — 3074F SYST BP LT 130 MM HG: CPT | Performed by: FAMILY MEDICINE

## 2025-07-14 PROCEDURE — 80053 COMPREHEN METABOLIC PANEL: CPT

## 2025-07-14 PROCEDURE — 3078F DIAST BP <80 MM HG: CPT | Performed by: FAMILY MEDICINE

## 2025-07-14 RX ORDER — TRIAMTERENE AND HYDROCHLOROTHIAZIDE 37.5; 25 MG/1; MG/1
1 CAPSULE ORAL EVERY MORNING
Qty: 90 CAPSULE | Refills: 1 | Status: SHIPPED | OUTPATIENT
Start: 2025-07-14

## 2025-07-14 RX ORDER — PRAVASTATIN SODIUM 40 MG
40 TABLET ORAL DAILY
Qty: 90 TABLET | Refills: 1 | Status: SHIPPED | OUTPATIENT
Start: 2025-07-14

## 2025-07-14 RX ORDER — VERAPAMIL HYDROCHLORIDE 120 MG/1
120 TABLET, FILM COATED, EXTENDED RELEASE ORAL DAILY
Qty: 90 TABLET | Refills: 1 | Status: SHIPPED | OUTPATIENT
Start: 2025-07-14

## 2025-07-14 RX ORDER — MELOXICAM 7.5 MG/1
7.5 TABLET ORAL DAILY
Qty: 90 TABLET | Refills: 0 | Status: SHIPPED | OUTPATIENT
Start: 2025-07-14

## 2025-07-14 RX ORDER — FLUOXETINE HYDROCHLORIDE 40 MG/1
40 CAPSULE ORAL DAILY
Qty: 90 CAPSULE | Refills: 1 | Status: SHIPPED | OUTPATIENT
Start: 2025-07-14

## 2025-07-14 NOTE — PROGRESS NOTES
Subjective   The ABCs of the Annual Wellness Visit  Medicare Wellness Visit      Norma Fletcher is a 79 y.o. patient who presents for a Medicare Wellness Visit.    The following portions of the patient's history were reviewed and   updated as appropriate: allergies, current medications, past family history, past medical history, past social history, past surgical history, and problem list.    Compared to one year ago, the patient's physical   health is the same.  Compared to one year ago, the patient's mental   health is the same.    Recent Hospitalizations:  She was not admitted to the hospital during the last year.     Current Medical Providers:  Patient Care Team:  Stephanie Terry DO as PCP - General (Family Medicine)  Stephanie Velazquez MD as Obstetrician (Obstetrics and Gynecology)  Darci No (Urology)    Outpatient Medications Prior to Visit   Medication Sig Dispense Refill    aspirin 81 MG EC tablet Take 1 tablet by mouth Daily.      calcium carbonate (OS-KEYANNA) 600 MG tablet Take 1 tablet by mouth Daily.      dorzolamide-timolol (COSOPT) 22.3-6.8 MG/ML ophthalmic solution Administer 1 drop to both eyes 2 (Two) Times a Day.      esomeprazole (nexIUM) 20 MG capsule Take 1 capsule by mouth Every Morning Before Breakfast.      ferrous sulfate 324 (65 Fe) MG tablet delayed-release EC tablet Take 1 tablet by mouth 3 (Three) Times a Week.      HYDROcodone-acetaminophen (NORCO) 7.5-325 MG per tablet Take 1 tablet by mouth Every 6 (Six) Hours As Needed for Moderate Pain. 30 tablet 0    loratadine (Claritin) 10 MG tablet Take 1 tablet by mouth Daily. 90 tablet 0    Magnesium Oxide -Mg Supplement 400 (240 Mg) MG tablet Take 1 tablet by mouth Daily.      Multiple Vitamins-Minerals (b complex-C-E-zinc) tablet Take 1 tablet by mouth Daily.      vitamin C (ASCORBIC ACID) 250 MG tablet Take 2 tablets by mouth 3 (Three) Times a Week.      vitamin D3 125 MCG (5000 UT) capsule capsule Take 1 capsule by mouth  Daily.      VITAMIN E 400 UNIT capsule Take 1 capsule by mouth Daily.      Zinc 50 MG tablet Take 1 tablet by mouth Daily.      FLUoxetine (PROzac) 40 MG capsule Take 1 capsule by mouth Daily. 90 capsule 0    meloxicam (Mobic) 7.5 MG tablet Take 1 tablet by mouth Daily. 90 tablet 0    metFORMIN (GLUCOPHAGE) 500 MG tablet Take 1.5 tablets by mouth 2 (Two) Times a Day With Meals for 180 days. TAKE 1 & 1/2 (ONE & ONE-HALF) TABLETS BY MOUTH TWICE DAILY 270 tablet 0    pravastatin (PRAVACHOL) 40 MG tablet Take 1 tablet by mouth Daily. 90 tablet 0    triamterene-hydrochlorothiazide (DYAZIDE) 37.5-25 MG per capsule Take 1 capsule by mouth Every Morning. 90 capsule 0    verapamil SR (CALAN-SR) 120 MG CR tablet Take 1 tablet by mouth Daily. 90 tablet 0     No facility-administered medications prior to visit.     Opioid medication/s are on active medication list.  and I have evaluated her active treatment plan and pain score trends (see table).  Vitals:    07/14/25 0755   PainSc: 0-No pain     I have reviewed the chart for potential of high risk medication and harmful drug interactions in the elderly.        Aspirin is on active medication list. Aspirin use is indicated based on review of current medical condition/s. Pros and cons of this therapy have been discussed today. Benefits of this medication outweigh potential harm.  Patient has been encouraged to continue taking this medication.  .      Patient Active Problem List   Diagnosis    Palpitations    Controlled type 2 diabetes mellitus without complication, without long-term current use of insulin    Hypertension    Mixed hyperlipidemia    Situational anxiety    Numbness of right foot    Encounter for subsequent annual wellness visit (AWV) in Medicare patient    Vaginal prolapse    Right shoulder pain    Uterovaginal prolapse, s/p robotic lap sacrocolpopexy 4/16/24    Left foot pain     Advance Care Planning Advance Directive is not on file.  ACP discussion was held with  "the patient during this visit. Patient does not have an advance directive, information provided.            Objective   Vitals:    25 0755   BP: 110/60   BP Location: Right arm   Patient Position: Sitting   Cuff Size: Adult   Pulse: 76   Resp: 20   Temp: 98.6 °F (37 °C)   TempSrc: Temporal   SpO2: 96%   Weight: 69.7 kg (153 lb 9.6 oz)   Height: 157.5 cm (62.01\")   PainSc: 0-No pain       Estimated body mass index is 28.09 kg/m² as calculated from the following:    Height as of this encounter: 157.5 cm (62.01\").    Weight as of this encounter: 69.7 kg (153 lb 9.6 oz).                Does the patient have evidence of cognitive impairment? No                                                                                                Health  Risk Assessment    Smoking Status:  Social History     Tobacco Use   Smoking Status Former    Current packs/day: 0.00    Average packs/day: 0.5 packs/day for 7.0 years (3.5 ttl pk-yrs)    Types: Cigarettes    Start date: 1968    Quit date:     Years since quittin.5    Passive exposure: Never   Smokeless Tobacco Never     Alcohol Consumption:  Social History     Substance and Sexual Activity   Alcohol Use Yes    Comment: Maybe  1 glass a month       Fall Risk Screen  STEADI Fall Risk Assessment was completed, and patient is at LOW risk for falls.Assessment completed on:2025    Depression Screening   Little interest or pleasure in doing things? Not at all   Feeling down, depressed, or hopeless? Not at all   PHQ-2 Total Score 0      Health Habits and Functional and Cognitive Screenin/7/2025     9:43 AM   Functional & Cognitive Status   Do you have difficulty preparing food and eating? No   Do you have difficulty bathing yourself, getting dressed or grooming yourself? No   Do you have difficulty using the toilet? No   Do you have difficulty moving around from place to place? No   Do you have trouble with steps or getting out of a bed or a chair? No "   Current Diet Limited Junk Food   Dental Exam Up to date   Eye Exam Up to date   Exercise (times per week) 3 times per week   Current Exercises Include Gardening;Home Fitness Gym;House Cleaning   Do you need help using the phone?  No   Are you deaf or do you have serious difficulty hearing?  No   Do you need help to go to places out of walking distance? No   Do you need help shopping? No   Do you need help preparing meals?  No   Do you need help with housework?  No   Do you need help with laundry? No   Do you need help taking your medications? No   Do you need help managing money? No   Do you ever drive or ride in a car without wearing a seat belt? No   Have you felt unusual fatigue (could be tiredness), stress, anger or loneliness in the last month? No   Who do you live with? Child   If you need help, do you have trouble finding someone available to you? No   Have you been bothered in the last four weeks by sexual problems? No   Do you have difficulty concentrating, remembering or making decisions? No           Age-appropriate Screening Schedule:  Refer to the list below for future screening recommendations based on patient's age, sex and/or medical conditions. Orders for these recommended tests are listed in the plan section. The patient has been provided with a written plan.    Health Maintenance List  Health Maintenance   Topic Date Due    URINE MICROALBUMIN-CREATININE RATIO (uACR)  Never done    HEMOGLOBIN A1C  07/14/2025    LIPID PANEL  07/15/2025    COVID-19 Vaccine (6 - 2024-25 season) 07/28/2025 (Originally 9/1/2024)    TDAP/TD VACCINES (2 - Td or Tdap) 10/14/2025 (Originally 10/30/2024)    INFLUENZA VACCINE  10/01/2025    DIABETIC FOOT EXAM  01/28/2026    DIABETIC EYE EXAM  04/28/2026    ANNUAL WELLNESS VISIT  07/14/2026    DXA SCAN  08/05/2026    HEPATITIS C SCREENING  Completed    RSV Vaccine - Adults  Completed    Pneumococcal Vaccine 50+  Completed    ZOSTER VACCINE  Completed    COLORECTAL CANCER  SCREENING  Discontinued                                                                                                                                                CMS Preventative Services Quick Reference  Risk Factors Identified During Encounter  Fall Risk-High or Moderate: Information on Fall Prevention Shared in After Visit Summary  Immunizations Discussed/Encouraged: Tdap and Influenza  Dental Screening Recommended  Vision Screening Recommended    The above risks/problems have been discussed with the patient.  Pertinent information has been shared with the patient in the After Visit Summary.  An After Visit Summary and PPPS were made available to the patient.    Follow Up:   Next Medicare Wellness visit to be scheduled in 1 year.         Additional E&M Note during same encounter follows:  Patient has additional, significant, and separately identifiable condition(s)/problem(s) that require work above and beyond the Medicare Wellness Visit     Chief Complaint  Medicare Wellness-subsequent    Subjective    HPI  Norma is also being seen today for additional medical problem/s.       The patient is a 79-year-old female who presents for evaluation of diabetes, hypertension, hyperlipidemia, arthritis, allergies, and gastroesophageal reflux disease.    She reports no new health issues since her last visit. Her physical and mental health status remains unchanged compared to the previous year. She has not experienced any falls and has been mobile without difficulty. She has not required hospitalization in the past year. She does not have an advanced care plan or directive in place. She is currently on metformin for diabetes management, which she tolerates well without any gastrointestinal side effects.    She did not take her blood pressure medication this morning and reports not monitoring her blood pressure regularly. She is considering starting regular blood pressure checks if it continues to run low. Her current  "medications include triamterene with HCTZ and verapamil 120 mg.    She continues to take Pravachol for cholesterol management.    She is on Prozac, which she finds effective.    She is taking meloxicam for arthritis and is curious if it should be taken daily.    Her allergies are currently under control, and she has Claritin available if needed.    She uses over-the-counter Nexium for stomach issues, which she finds helpful.    She is due for a Tdap vaccine. She has had coffee this morning. She inquired about the need for a Pap smear. She no longer uses a pessary as she had surgery performed by Dr. Trammell.    Coffee/Tea/Caffeine-containing Drinks: The patient drinks coffee.    PAST SURGICAL HISTORY:  - Surgery performed by Dr. Trammell (specific details not provided)          Objective   Vital Signs:  /60 (BP Location: Right arm, Patient Position: Sitting, Cuff Size: Adult)   Pulse 76   Temp 98.6 °F (37 °C) (Temporal)   Resp 20   Ht 157.5 cm (62.01\")   Wt 69.7 kg (153 lb 9.6 oz)   SpO2 96%   BMI 28.09 kg/m²   Physical Exam  Vitals reviewed.   Constitutional:       Appearance: She is not ill-appearing.   Eyes:      Pupils: Pupils are equal, round, and reactive to light.   Cardiovascular:      Rate and Rhythm: Normal rate.      Pulses: Normal pulses.   Pulmonary:      Effort: Pulmonary effort is normal.      Breath sounds: Normal breath sounds.   Neurological:      General: No focal deficit present.      Mental Status: She is alert. Mental status is at baseline.   Psychiatric:         Mood and Affect: Mood normal.         Behavior: Behavior normal.         Thought Content: Thought content normal.         Judgment: Judgment normal.           Respiratory: Clear to auscultation, no wheezing, rales or rhonchi      Results  Labs   - A1c: 6.7%   - Urine test: Normal           Assessment and Plan     Diagnoses and all orders for this visit:    1. Encounter for subsequent annual wellness visit (AWV) in Medicare " patient (Primary)  Assessment & Plan:  Annual wellness exam completed today. Health Maintenance including immunizations was updated and reflected in the chart. Yearly screening labs were ordered.     Further recommendations to be given once lab data received.     Health advice: healthy food choices with fresh fruits and vegetables, maintain sleep pattern at least 8 hours, avoid texting and distracted driving practices; wear safety belt, engage in regular exercise, maintain healthy weight, use safe sex practices, avoid alcohol and illicit drugs. Maintain immunizations that are up to date. Maintain health maintenance:Colonoscopy DEXA, Mammo, PAP, etc.  Follow up with PCP if struggling with depression or anxiety. Keep regular dental and eye exams. Brush and floss teeth daily.     I suggest they take a daily multivitamin that is age-appropriate (example women's One-A-Day.)  Patient voiced understanding of these instructions.     Follow up Annually for Physical       Orders:  -     CBC & Differential; Future  -     Comprehensive Metabolic Panel; Future  -     Lipid Panel; Future  -     TSH Rfx On Abnormal To Free T4; Future  -     Vitamin B12; Future  -     Vitamin D,25-Hydroxy; Future    2. Controlled type 2 diabetes mellitus without complication, without long-term current use of insulin  -     POC Glycosylated Hemoglobin (Hb A1C)  -     POC Albumin/Creatinine Ratio Urine  -     metFORMIN (GLUCOPHAGE) 500 MG tablet; Take 1.5 tablets by mouth 2 (Two) Times a Day With Meals for 180 days. TAKE 1 & 1/2 (ONE & ONE-HALF) TABLETS BY MOUTH TWICE DAILY  Dispense: 270 tablet; Refill: 1    3. Primary hypertension  -     triamterene-hydrochlorothiazide (DYAZIDE) 37.5-25 MG per capsule; Take 1 capsule by mouth Every Morning.  Dispense: 90 capsule; Refill: 1  -     verapamil SR (CALAN-SR) 120 MG CR tablet; Take 1 tablet by mouth Daily.  Dispense: 90 tablet; Refill: 1  -     Comprehensive Metabolic Panel; Future    4. Mixed  hyperlipidemia  -     pravastatin (PRAVACHOL) 40 MG tablet; Take 1 tablet by mouth Daily.  Dispense: 90 tablet; Refill: 1  -     Comprehensive Metabolic Panel; Future  -     Lipid Panel; Future    5. Situational anxiety  -     FLUoxetine (PROzac) 40 MG capsule; Take 1 capsule by mouth Daily.  Dispense: 90 capsule; Refill: 1    6. Left foot pain  -     meloxicam (Mobic) 7.5 MG tablet; Take 1 tablet by mouth Daily.  Dispense: 90 tablet; Refill: 0    7. Vitamin D deficiency  -     Vitamin D,25-Hydroxy; Future           1. Diabetes Mellitus.  - A1c level is stable at 6.7, within the desired range.  - Currently on metformin, tolerating well without gastrointestinal upset.  - Blood work will be conducted today; results will be communicated via message or phone call.  - Urine test for excess protein was normal.    2. Hypertension.  - Blood pressure reading today is 110/60, slightly on the lower side.  - Advised to monitor blood pressure regularly; inform if it consistently remains low for medication adjustment.  - Currently on triamterene with HCTZ and verapamil 120 mg.  - No medication taken this morning.    3. Hyperlipidemia.  - Currently taking Pravachol for cholesterol management.  - Refills sent to pharmacy.  - No recent changes in medication.    4. Depression.  - Currently on Prozac, which is effective.  - Refills sent to pharmacy.  - No recent changes in medication.    5. Arthritis.  - Advised to take meloxicam as needed for arthritis symptoms such as stiffness or joint pain.  - No requirement to take meloxicam daily.    6. Allergies.  - Claritin available if needed for allergy symptoms.  - No recent issues with allergies.    7. Gastroesophageal Reflux Disease.  - Uses over-the-counter Nexium for stomach issues, which is effective.  - No recent changes in medication.    8. Health Maintenance.  - Due for Tdap vaccine; advised to receive it at the pharmacy.  - No longer needs Pap smears following surgery performed by  Dr. Trammell.  - Blood work to be done today.         Follow Up   Return in about 3 months (around 10/14/2025) for followup DM, HTN.  Patient was given instructions and counseling regarding her condition or for health maintenance advice. Please see specific information pulled into the AVS if appropriate.  Patient or patient representative verbalized consent for the use of Ambient Listening during the visit with  Stephanie Terry DO for chart documentation. 7/14/2025  08:12 EDT      This document has been electronically signed by Stephanie Terry DO   July 14, 2025 08:18 EDT    Dictated Utilizing Dragon Dictation: Part of this note may be an electronic transcription/translation of spoken language to printed text using the Dragon Dictation System.    Stephanie Terry D.O.  [facility] [place]

## 2025-07-15 LAB
25(OH)D3 SERPL-MCNC: 64 NG/ML (ref 30–100)
VIT B12 BLD-MCNC: 318 PG/ML (ref 211–946)

## (undated) DEVICE — LEGGINGS, PAIR, 29X43, STERILE: Brand: MEDLINE

## (undated) DEVICE — SYR LL TP 10ML STRL

## (undated) DEVICE — DRAPE,UNDERBUTTOCKS,PCH,STERILE: Brand: MEDLINE

## (undated) DEVICE — PACKING,VAGINAL,XR,ST,4"X36",100EA: Brand: MEDLINE

## (undated) DEVICE — DRAPE,TOP,102X53,STERILE: Brand: MEDLINE

## (undated) DEVICE — COLUMN DRAPE

## (undated) DEVICE — PATIENT RETURN ELECTRODE, SINGLE-USE, CONTACT QUALITY MONITORING, ADULT, WITH 9FT CORD, FOR PATIENTS WEIGING OVER 33LBS. (15KG): Brand: MEGADYNE

## (undated) DEVICE — BLADELESS OBTURATOR: Brand: WECK VISTA

## (undated) DEVICE — SUT GORE THX26 CV2 36IN 2N05A

## (undated) DEVICE — TOTAL TRAY, 16FR 10ML SIL FOLEY, URN: Brand: MEDLINE

## (undated) DEVICE — ENDOPATH PNEUMONEEDLE INSUFFLATION NEEDLES WITH LUER LOCK CONNECTORS 120MM: Brand: ENDOPATH

## (undated) DEVICE — TIP COVER ACCESSORY

## (undated) DEVICE — PK UROL DAVINCI 10

## (undated) DEVICE — ANTIBACTERIAL UNDYED BRAIDED (POLYGLACTIN 910), SYNTHETIC ABSORBABLE SUTURE: Brand: COATED VICRYL

## (undated) DEVICE — ARM DRAPE

## (undated) DEVICE — INTENDED FOR TISSUE SEPARATION, AND OTHER PROCEDURES THAT REQUIRE A SHARP SURGICAL BLADE TO PUNCTURE OR CUT.: Brand: BARD-PARKER ® STAINLESS STEEL BLADES

## (undated) DEVICE — GLV SURG SENSICARE PI MIC PF SZ7.5 LF STRL

## (undated) DEVICE — SEAL

## (undated) DEVICE — BLANKT WARM UPPR/BDY ARM/OUT 57X196CM

## (undated) DEVICE — SUT VIC 0 UR6 27IN VCP603H